# Patient Record
Sex: MALE | Race: WHITE | Employment: OTHER | URBAN - METROPOLITAN AREA
[De-identification: names, ages, dates, MRNs, and addresses within clinical notes are randomized per-mention and may not be internally consistent; named-entity substitution may affect disease eponyms.]

---

## 2022-03-13 ENCOUNTER — APPOINTMENT (OUTPATIENT)
Dept: CT IMAGING | Age: 82
DRG: 287 | End: 2022-03-13
Payer: MEDICARE

## 2022-03-13 ENCOUNTER — HOSPITAL ENCOUNTER (INPATIENT)
Age: 82
LOS: 3 days | Discharge: HOME | DRG: 287 | End: 2022-03-16
Attending: INTERNAL MEDICINE | Admitting: INTERNAL MEDICINE
Payer: MEDICARE

## 2022-03-13 ENCOUNTER — APPOINTMENT (OUTPATIENT)
Dept: GENERAL RADIOLOGY | Age: 82
DRG: 287 | End: 2022-03-13
Payer: MEDICARE

## 2022-03-13 DIAGNOSIS — R55 NEAR SYNCOPE: Primary | ICD-10-CM

## 2022-03-13 DIAGNOSIS — R06.09 DOE (DYSPNEA ON EXERTION): ICD-10-CM

## 2022-03-13 PROBLEM — I10 HTN (HYPERTENSION): Status: ACTIVE | Noted: 2022-03-13

## 2022-03-13 PROBLEM — F32.A DEPRESSION: Status: ACTIVE | Noted: 2022-03-13

## 2022-03-13 PROBLEM — E78.00 HIGH CHOLESTEROL: Status: ACTIVE | Noted: 2022-03-13

## 2022-03-13 LAB
A/G RATIO: 2 (ref 1.1–2.2)
ALBUMIN SERPL-MCNC: 4.5 G/DL (ref 3.4–5)
ALP BLD-CCNC: 41 U/L (ref 40–129)
ALT SERPL-CCNC: 17 U/L (ref 10–40)
ANION GAP SERPL CALCULATED.3IONS-SCNC: 12 MMOL/L (ref 3–16)
AST SERPL-CCNC: 20 U/L (ref 15–37)
BASOPHILS ABSOLUTE: 0.1 K/UL (ref 0–0.2)
BASOPHILS RELATIVE PERCENT: 0.7 %
BILIRUB SERPL-MCNC: 1.2 MG/DL (ref 0–1)
BILIRUBIN URINE: NEGATIVE
BLOOD, URINE: ABNORMAL
BUN BLDV-MCNC: 26 MG/DL (ref 7–20)
CALCIUM SERPL-MCNC: 9.8 MG/DL (ref 8.3–10.6)
CHLORIDE BLD-SCNC: 102 MMOL/L (ref 99–110)
CLARITY: CLEAR
CO2: 24 MMOL/L (ref 21–32)
COLOR: YELLOW
CREAT SERPL-MCNC: 1.3 MG/DL (ref 0.8–1.3)
EOSINOPHILS ABSOLUTE: 0 K/UL (ref 0–0.6)
EOSINOPHILS RELATIVE PERCENT: 0.4 %
EPITHELIAL CELLS, UA: 3 /HPF (ref 0–5)
GFR AFRICAN AMERICAN: >60
GFR NON-AFRICAN AMERICAN: 53
GLUCOSE BLD-MCNC: 109 MG/DL (ref 70–99)
GLUCOSE URINE: NEGATIVE MG/DL
HCT VFR BLD CALC: 42.3 % (ref 40.5–52.5)
HEMOGLOBIN: 14.2 G/DL (ref 13.5–17.5)
HYALINE CASTS: ABNORMAL /LPF (ref 0–2)
KETONES, URINE: ABNORMAL MG/DL
LEUKOCYTE ESTERASE, URINE: ABNORMAL
LYMPHOCYTES ABSOLUTE: 1.6 K/UL (ref 1–5.1)
LYMPHOCYTES RELATIVE PERCENT: 18.3 %
MAGNESIUM: 1.9 MG/DL (ref 1.8–2.4)
MCH RBC QN AUTO: 30.7 PG (ref 26–34)
MCHC RBC AUTO-ENTMCNC: 33.6 G/DL (ref 31–36)
MCV RBC AUTO: 91.6 FL (ref 80–100)
MICROSCOPIC EXAMINATION: YES
MONOCYTES ABSOLUTE: 0.7 K/UL (ref 0–1.3)
MONOCYTES RELATIVE PERCENT: 8.2 %
NEUTROPHILS ABSOLUTE: 6.3 K/UL (ref 1.7–7.7)
NEUTROPHILS RELATIVE PERCENT: 72.4 %
NITRITE, URINE: NEGATIVE
PDW BLD-RTO: 12.9 % (ref 12.4–15.4)
PH UA: 5 (ref 5–8)
PLATELET # BLD: 196 K/UL (ref 135–450)
PMV BLD AUTO: 7.6 FL (ref 5–10.5)
POTASSIUM SERPL-SCNC: 4.9 MMOL/L (ref 3.5–5.1)
PRO-BNP: 2053 PG/ML (ref 0–449)
PROTEIN UA: ABNORMAL MG/DL
RBC # BLD: 4.61 M/UL (ref 4.2–5.9)
RBC UA: 3 /HPF (ref 0–4)
SODIUM BLD-SCNC: 138 MMOL/L (ref 136–145)
SPECIFIC GRAVITY UA: 1.02 (ref 1–1.03)
TOTAL PROTEIN: 6.8 G/DL (ref 6.4–8.2)
TROPONIN: <0.01 NG/ML
URINE REFLEX TO CULTURE: YES
URINE TYPE: ABNORMAL
UROBILINOGEN, URINE: 0.2 E.U./DL
WBC # BLD: 8.7 K/UL (ref 4–11)
WBC UA: 20 /HPF (ref 0–5)

## 2022-03-13 PROCEDURE — 71045 X-RAY EXAM CHEST 1 VIEW: CPT

## 2022-03-13 PROCEDURE — 70450 CT HEAD/BRAIN W/O DYE: CPT

## 2022-03-13 PROCEDURE — 87086 URINE CULTURE/COLONY COUNT: CPT

## 2022-03-13 PROCEDURE — 83880 ASSAY OF NATRIURETIC PEPTIDE: CPT

## 2022-03-13 PROCEDURE — 84484 ASSAY OF TROPONIN QUANT: CPT

## 2022-03-13 PROCEDURE — 80053 COMPREHEN METABOLIC PANEL: CPT

## 2022-03-13 PROCEDURE — 87077 CULTURE AEROBIC IDENTIFY: CPT

## 2022-03-13 PROCEDURE — 83735 ASSAY OF MAGNESIUM: CPT

## 2022-03-13 PROCEDURE — 2060000000 HC ICU INTERMEDIATE R&B

## 2022-03-13 PROCEDURE — 93005 ELECTROCARDIOGRAM TRACING: CPT | Performed by: INTERNAL MEDICINE

## 2022-03-13 PROCEDURE — 85025 COMPLETE CBC W/AUTO DIFF WBC: CPT

## 2022-03-13 PROCEDURE — 81001 URINALYSIS AUTO W/SCOPE: CPT

## 2022-03-13 PROCEDURE — 36415 COLL VENOUS BLD VENIPUNCTURE: CPT

## 2022-03-13 PROCEDURE — 87186 SC STD MICRODIL/AGAR DIL: CPT

## 2022-03-13 PROCEDURE — 2580000003 HC RX 258: Performed by: INTERNAL MEDICINE

## 2022-03-13 PROCEDURE — 99283 EMERGENCY DEPT VISIT LOW MDM: CPT

## 2022-03-13 RX ORDER — AMLODIPINE BESYLATE 10 MG/1
10 TABLET ORAL DAILY
COMMUNITY

## 2022-03-13 RX ORDER — LORATADINE 10 MG/1
10 TABLET ORAL NIGHTLY
COMMUNITY

## 2022-03-13 RX ORDER — ATORVASTATIN CALCIUM 10 MG/1
10 TABLET, FILM COATED ORAL DAILY
Status: DISCONTINUED | OUTPATIENT
Start: 2022-03-14 | End: 2022-03-16 | Stop reason: HOSPADM

## 2022-03-13 RX ORDER — ACETAMINOPHEN 650 MG/1
650 SUPPOSITORY RECTAL EVERY 6 HOURS PRN
Status: DISCONTINUED | OUTPATIENT
Start: 2022-03-13 | End: 2022-03-16 | Stop reason: HOSPADM

## 2022-03-13 RX ORDER — SODIUM CHLORIDE 9 MG/ML
INJECTION, SOLUTION INTRAVENOUS CONTINUOUS
Status: DISCONTINUED | OUTPATIENT
Start: 2022-03-13 | End: 2022-03-14

## 2022-03-13 RX ORDER — ONDANSETRON 4 MG/1
4 TABLET, ORALLY DISINTEGRATING ORAL EVERY 8 HOURS PRN
Status: DISCONTINUED | OUTPATIENT
Start: 2022-03-13 | End: 2022-03-16 | Stop reason: HOSPADM

## 2022-03-13 RX ORDER — ONDANSETRON 2 MG/ML
4 INJECTION INTRAMUSCULAR; INTRAVENOUS EVERY 6 HOURS PRN
Status: DISCONTINUED | OUTPATIENT
Start: 2022-03-13 | End: 2022-03-16 | Stop reason: HOSPADM

## 2022-03-13 RX ORDER — POTASSIUM CHLORIDE 7.45 MG/ML
10 INJECTION INTRAVENOUS PRN
Status: DISCONTINUED | OUTPATIENT
Start: 2022-03-13 | End: 2022-03-13 | Stop reason: SDUPTHER

## 2022-03-13 RX ORDER — POTASSIUM CHLORIDE 20 MEQ/1
40 TABLET, EXTENDED RELEASE ORAL PRN
Status: DISCONTINUED | OUTPATIENT
Start: 2022-03-13 | End: 2022-03-13 | Stop reason: SDUPTHER

## 2022-03-13 RX ORDER — SODIUM CHLORIDE 9 MG/ML
25 INJECTION, SOLUTION INTRAVENOUS PRN
Status: DISCONTINUED | OUTPATIENT
Start: 2022-03-13 | End: 2022-03-16 | Stop reason: HOSPADM

## 2022-03-13 RX ORDER — AMLODIPINE BESYLATE 5 MG/1
10 TABLET ORAL DAILY
Status: DISCONTINUED | OUTPATIENT
Start: 2022-03-14 | End: 2022-03-16 | Stop reason: HOSPADM

## 2022-03-13 RX ORDER — ATORVASTATIN CALCIUM 10 MG/1
5 TABLET, FILM COATED ORAL DAILY
COMMUNITY

## 2022-03-13 RX ORDER — SODIUM CHLORIDE 0.9 % (FLUSH) 0.9 %
5-40 SYRINGE (ML) INJECTION EVERY 12 HOURS SCHEDULED
Status: DISCONTINUED | OUTPATIENT
Start: 2022-03-13 | End: 2022-03-16 | Stop reason: HOSPADM

## 2022-03-13 RX ORDER — SODIUM CHLORIDE 0.9 % (FLUSH) 0.9 %
10 SYRINGE (ML) INJECTION PRN
Status: DISCONTINUED | OUTPATIENT
Start: 2022-03-13 | End: 2022-03-16 | Stop reason: HOSPADM

## 2022-03-13 RX ORDER — POTASSIUM CHLORIDE 7.45 MG/ML
10 INJECTION INTRAVENOUS PRN
Status: DISCONTINUED | OUTPATIENT
Start: 2022-03-13 | End: 2022-03-16 | Stop reason: HOSPADM

## 2022-03-13 RX ORDER — ACETAMINOPHEN/DIPHENHYDRAMINE 500MG-25MG
1 TABLET ORAL NIGHTLY PRN
COMMUNITY

## 2022-03-13 RX ORDER — M-VIT,TX,IRON,MINS/CALC/FOLIC 27MG-0.4MG
1 TABLET ORAL DAILY
COMMUNITY

## 2022-03-13 RX ORDER — ACETAMINOPHEN 325 MG/1
650 TABLET ORAL EVERY 6 HOURS PRN
Status: DISCONTINUED | OUTPATIENT
Start: 2022-03-13 | End: 2022-03-16 | Stop reason: HOSPADM

## 2022-03-13 RX ORDER — 0.9 % SODIUM CHLORIDE 0.9 %
500 INTRAVENOUS SOLUTION INTRAVENOUS PRN
Status: DISCONTINUED | OUTPATIENT
Start: 2022-03-13 | End: 2022-03-16 | Stop reason: HOSPADM

## 2022-03-13 RX ORDER — POTASSIUM CHLORIDE 20 MEQ/1
40 TABLET, EXTENDED RELEASE ORAL PRN
Status: DISCONTINUED | OUTPATIENT
Start: 2022-03-13 | End: 2022-03-16 | Stop reason: HOSPADM

## 2022-03-13 RX ORDER — HYDRALAZINE HYDROCHLORIDE 20 MG/ML
10 INJECTION INTRAMUSCULAR; INTRAVENOUS EVERY 6 HOURS PRN
Status: DISCONTINUED | OUTPATIENT
Start: 2022-03-13 | End: 2022-03-16 | Stop reason: HOSPADM

## 2022-03-13 RX ADMIN — SODIUM CHLORIDE: 9 INJECTION, SOLUTION INTRAVENOUS at 22:40

## 2022-03-13 RX ADMIN — Medication 10 ML: at 22:43

## 2022-03-13 ASSESSMENT — ENCOUNTER SYMPTOMS
CONSTIPATION: 0
COLOR CHANGE: 0
STRIDOR: 0
SHORTNESS OF BREATH: 1
DIARRHEA: 0
BACK PAIN: 0
NAUSEA: 0
COUGH: 0
VOMITING: 0
WHEEZING: 0
ABDOMINAL PAIN: 0

## 2022-03-13 ASSESSMENT — PAIN SCALES - GENERAL: PAINLEVEL_OUTOF10: 0

## 2022-03-13 NOTE — H&P
for polydipsia and polyphagia. Genitourinary: Negative for frequency, hematuria and urgency. Musculoskeletal: Negative for back pain and myalgias. Skin: Negative for rash. Allergic/Immunologic: Negative for food allergies. Neurological: Negative for seizures, syncope and facial asymmetry. Positive for dizziness, headache  Hematological: Negative for adenopathy. Psychiatric/Behavioral: Negative for dysphoric mood. The patient is not nervous/anxious. Past Medical History:   Past Medical History:   Diagnosis Date    Anxiety     Hyperlipidemia     Hypertension        Past Surgical History: History reviewed. No pertinent surgical history. Social History:   Social History     Tobacco History     Smoking Status  Never Assessed    Smokeless Tobacco Use  Unknown          Alcohol History     Alcohol Use Status  Not Asked          Drug Use     Drug Use Status  Not Asked          Sexual Activity     Sexually Active  Not Asked                Fam History: History reviewed. No pertinent family history. PFSH: The above PMHx, PSHx, SocHx, FamHx has been reviewed by myself. (1 area for detailed, 2-3 for comprehensive)      Code Status: No Order    Meds  following list of home medications fromelectronic chart has been reviewed by myself  Prior to Admission medications    Medication Sig Start Date End Date Taking?  Authorizing Provider   sertraline (ZOLOFT) 50 MG tablet Take 50 mg by mouth daily   Yes Historical Provider, MD   amLODIPine (NORVASC) 10 MG tablet Take 10 mg by mouth daily   Yes Historical Provider, MD   atorvastatin (LIPITOR) 10 MG tablet Take 10 mg by mouth daily   Yes Historical Provider, MD         No Known Allergies          EXAM: (2-7 system for EPF/Detailed, ?8 for Comprehensive)  /71   Pulse 57   Temp 98 °F (36.7 °C)   Resp 14   Ht 5' 8\" (1.727 m)   Wt 165 lb (74.8 kg)   SpO2 99%   BMI 25.09 kg/m²   Constitutional: vitals as above: alert, appears stated age and cooperative    Psychiatric: normal insight and judgment, oriented to person, place, time, and general circumstances    Head: Normocephalic, without obvious abnormality, atraumatic    Eyes:lids and lashes normal, conjunctivae and sclerae normal and pupils equal, round, reactive to light and accomodation    EMNT: external ears normal, nares midline    Neck: no carotid bruit, supple, symmetrical, trachea midline and thyroid not enlarged, symmetric, no tenderness/mass/nodules     Respiratory: clear to auscultation and percussion bilaterally with normal respiratory effort    Cardiovascular: normal rate, regular rhythm, normal S1 and S2 and no murmurs    Gastrointestinal: soft, non-tender, non-distended, normal bowel sounds, no masses or organomegaly    Extremities: no clubbing, no edema    Skin:No rashes or nodules noted. Neurologic:negative         LABS:  Labs Reviewed   COMPREHENSIVE METABOLIC PANEL - Abnormal; Notable for the following components:       Result Value    Glucose 109 (*)     BUN 26 (*)     GFR Non- 53 (*)     Total Bilirubin 1.2 (*)     All other components within normal limits   BRAIN NATRIURETIC PEPTIDE - Abnormal; Notable for the following components:    Pro-BNP 2,053 (*)     All other components within normal limits   CBC WITH AUTO DIFFERENTIAL   TROPONIN   MAGNESIUM   URINALYSIS WITH REFLEX TO CULTURE         IMAGING:  Imaging results from the ER have been reviewed in the computerized chart. CT HEAD WO CONTRAST    Result Date: 3/13/2022  EXAMINATION: CT OF THE HEAD WITHOUT CONTRAST  3/13/2022 4:24 pm TECHNIQUE: CT of the head was performed without the administration of intravenous contrast. Dose modulation, iterative reconstruction, and/or weight based adjustment of the mA/kV was utilized to reduce the radiation dose to as low as reasonably achievable. COMPARISON: None.  HISTORY: ORDERING SYSTEM PROVIDED HISTORY: dizziness TECHNOLOGIST PROVIDED HISTORY: Reason for exam:->dizziness Has a \"code stroke\" or \"stroke alert\" been called? ->No Decision Support Exception - unselect if not a suspected or confirmed emergency medical condition->Emergency Medical Condition (MA) Reason for Exam: dizziness Relevant Medical/Surgical History: Dizziness (c/o dizzyness on and off for months) FINDINGS: BRAIN/VENTRICLES: There is no acute intracranial hemorrhage, mass effect or midline shift. No abnormal extra-axial fluid collection. The gray-white differentiation is maintained without evidence of an acute infarct. There is no evidence of hydrocephalus. There are chronic atrophic changes consistent with the patient's age. ORBITS: The visualized portion of the orbits demonstrate no acute abnormality. SINUSES: The visualized paranasal sinuses and mastoid air cells demonstrate no acute abnormality. SOFT TISSUES/SKULL:  No acute abnormality of the visualized skull or soft tissues. No acute intracranial abnormality. XR CHEST PORTABLE    Result Date: 3/13/2022  EXAMINATION: ONE XRAY VIEW OF THE CHEST 3/13/2022 2:43 pm COMPARISON: None. HISTORY: ORDERING SYSTEM PROVIDED HISTORY: dizzy TECHNOLOGIST PROVIDED HISTORY: Reason for exam:->dizzy Reason for Exam: Dizziness (c/o dizzyness on and off for months) FINDINGS: No tracheal or mediastinal shift was noted. No cardiomegaly, pneumonia, interstitial edema or pleural effusions were identified. No hilar mass was noted. The regional skeleton was unremarkable. No cardiomegaly, pneumonia or interstitial edema. EKG:   EKG from ER, reviewed by self  it shows normal sinus at 63.  No acute st changes noted  Old chart reviewed, no old EKG available    Lab Results   Component Value Date    GLUCOSE 109 03/13/2022     No results found for: POCGLU  /71   Pulse 57   Temp 98 °F (36.7 °C)   Resp 14   Ht 5' 8\" (1.727 m)   Wt 165 lb (74.8 kg)   SpO2 99%   BMI 25.09 kg/m²     MEDICAL DECISION MAKING:    Principal Problem:    Syncope and

## 2022-03-13 NOTE — ED PROVIDER NOTES
EKG NOTE:    Sinus rhythm at a rate of 63 beats a minute with no acute ST elevations or depressions or pathologic Q waves. I was not involved with the care of this patient.        Vasiliy Leo MD  03/13/22 4779

## 2022-03-13 NOTE — ED PROVIDER NOTES
[facility]        Pt Name: Lukas Humphries  MRN: [id number]  [place] 1940  Date of evaluation: 3/13/2022  Provider: Nia Oliver PA-C  PCP: Amandeep Kapoor  Note Started: 4:03 PM EDT       DAVIS. I have evaluated this patient. My supervising physician was available for consultation. CHIEF COMPLAINT       Chief Complaint   Patient presents with    Dizziness     c/o dizzyness on and off for months       HISTORY OF PRESENT ILLNESS   (Location, Timing/Onset, Context/Setting, Quality, Duration, Modifying Factors, Severity, Associated Signs and Symptoms)  Note limiting factors. Chief Complaint: Orthostatic lightheadedness and exertional shortness of breath    Lukas Humphries is a 78 y.o. male who presents to the emergency department complaining of dizziness on and off for several months. He specifies it is not the room that is spinning, rather that he feels dizzy/lightheaded when he stands up quickly from a seated position. He also reports that even just short distances to the mailbox makes him feel very short of breath. He reports a mild occipital headache with radiation into the neck. This is been going on intermittently for months as well. He denies any preceding injury. Denies visual disturbances, back pain, neck stiffness, chest pain, cough, fever, chills, palpitations, hemoptysis, abdominal pain. Does report some increased swelling in the bilateral lower extremities over the past few weeks. Nursing Notes were all reviewed and agreed with or any disagreements were addressed in the HPI. REVIEW OF SYSTEMS    (2-9 systems for level 4, 10 or more for level 5)     Review of Systems   Constitutional: Positive for fatigue. Negative for chills and fever. HENT: Negative. Eyes: Negative for visual disturbance. Respiratory: Positive for shortness of breath. Negative for cough, wheezing and stridor. Cardiovascular: Positive for leg swelling. Negative for chest pain and palpitations. Gastrointestinal: Negative for abdominal pain, constipation, diarrhea, nausea and vomiting. Genitourinary: Negative. Musculoskeletal: Negative for back pain, neck pain and neck stiffness. Skin: Negative for color change, pallor, rash and wound. Neurological: Negative for dizziness, tremors, seizures, syncope, facial asymmetry, speech difficulty, weakness, light-headedness, numbness and headaches. Psychiatric/Behavioral: Negative for confusion. All other systems reviewed and are negative. Positives and Pertinent negatives as per HPI. Except as noted above in the ROS, all other systems were reviewed and negative. PAST MEDICAL HISTORY     Past Medical History:   Diagnosis Date    Anxiety     Hyperlipidemia     Hypertension          SURGICAL HISTORY   History reviewed. No pertinent surgical history. CURRENTMEDICATIONS       Previous Medications    No medications on file         ALLERGIES     Patient has no known allergies. FAMILYHISTORY     History reviewed. No pertinent family history. SOCIAL HISTORY       Social History     Tobacco Use    Smoking status: Not on file    Smokeless tobacco: Not on file   Substance Use Topics    Alcohol use: Not on file    Drug use: Not on file       SCREENINGS   NIH Stroke Scale  Interval: Baseline  Level of Consciousness (1a. ): Alert  LOC Questions (1b. ):  Answers both correctly  LOC Commands (1c. ): Performs both tasks correctly  Best Gaze (2. ): Normal  Visual (3. ): No visual loss  Facial Palsy (4. ): Normal symmetrical movement  Motor Arm, Left (5a. ): No drift  Motor Arm, Right (5b. ): No drift  Motor Leg, Left (6a. ): No drift  Motor Leg, Right (6b. ): No drift  Limb Ataxia (7. ): Absent  Sensory (8. ): Normal  Best Language (9. ): No aphasia  Dysarthria (10. ): Normal  Extinction and Inattention (11): No abnormality  Total: 0Glasgow Coma Scale  Eye Opening: Spontaneous  Best Verbal Response: Oriented  Best Motor Response: Obeys commands  Michael Coma Scale Score: 15        PHYSICAL EXAM    (up to 7 for level 4, 8 or more for level 5)     ED Triage Vitals [03/13/22 1441]   BP Temp Temp src Pulse Resp SpO2 Height Weight   129/77 98 °F (36.7 °C) -- 74 20 100 % 5' 8\" (1.727 m) 165 lb (74.8 kg)       Physical Exam  Vitals and nursing note reviewed. Constitutional:       Appearance: Normal appearance. He is well-developed. He is not toxic-appearing or diaphoretic. HENT:      Head: Normocephalic and atraumatic. Right Ear: External ear normal.      Left Ear: External ear normal.      Nose: Nose normal.      Mouth/Throat:      Mouth: Mucous membranes are moist.      Pharynx: Oropharynx is clear. Eyes:      General: No scleral icterus. Right eye: No discharge. Left eye: No discharge. Extraocular Movements: Extraocular movements intact. Conjunctiva/sclera: Conjunctivae normal.      Pupils: Pupils are equal, round, and reactive to light. Cardiovascular:      Rate and Rhythm: Normal rate. Pulmonary:      Effort: Pulmonary effort is normal.      Breath sounds: Normal breath sounds. Abdominal:      General: Bowel sounds are normal.      Palpations: Abdomen is soft. Tenderness: There is no abdominal tenderness. There is no right CVA tenderness or left CVA tenderness. Musculoskeletal:         General: Normal range of motion. Cervical back: Normal range of motion. Comments: Trace symmetrical pretibial edema. No posterior calf or thigh tenderness, palpable cord, discoloration. Negative homans. Skin:     General: Skin is warm and dry. Capillary Refill: Capillary refill takes less than 2 seconds. Coloration: Skin is not jaundiced or pale. Findings: No bruising, erythema, lesion or rash. Neurological:      General: No focal deficit present.       Mental Status: He is alert and oriented to person, place, and time. Cranial Nerves: No cranial nerve deficit (II-XII intact). Sensory: No sensory deficit. Motor: No weakness. Deep Tendon Reflexes: Reflexes normal.      Comments: No pronator drift, facial droop or slurred speech. Normal finger to nose coordination. Normal rapid alternating hand movement. Normal heel to shin coordination  Misti Hallpike negative without nystagmus. 5 out of 5 strength in all 4 extremities without focal weakness, paresthesia or radiculopathy. Psychiatric:         Mood and Affect: Mood normal.         Behavior: Behavior normal.         DIAGNOSTIC RESULTS   LABS:    Labs Reviewed   COMPREHENSIVE METABOLIC PANEL - Abnormal; Notable for the following components:       Result Value    Glucose 109 (*)     BUN 26 (*)     GFR Non- 53 (*)     Total Bilirubin 1.2 (*)     All other components within normal limits   BRAIN NATRIURETIC PEPTIDE - Abnormal; Notable for the following components:    Pro-BNP 2,053 (*)     All other components within normal limits   CBC WITH AUTO DIFFERENTIAL   TROPONIN   MAGNESIUM   URINALYSIS WITH REFLEX TO CULTURE       When ordered only abnormal lab results are displayed. All other labs were within normal range or not returned as of this dictation. EKG: When ordered, EKG's are interpreted by the Emergency Department Physician in the absence of a cardiologist.  Please see their note for interpretation of EKG. RADIOLOGY:   Non-plain film images such as CT, Ultrasound and MRI are read by the radiologist. Plain radiographic images are visualized and preliminarily interpreted by the ED Provider with the below findings:        Interpretation per the Radiologist below, if available at the time of this note:    CT HEAD WO CONTRAST   Final Result   No acute intracranial abnormality. XR CHEST PORTABLE   Final Result   No cardiomegaly, pneumonia or interstitial edema.                    PROCEDURES   Unless otherwise noted below, none     Procedures    CRITICAL CARE TIME   n/a    CONSULTS:  Arti Mccain will admit    EMERGENCY DEPARTMENT COURSE and DIFFERENTIAL DIAGNOSIS/MDM:   Vitals:    Vitals:    03/13/22 1604 03/13/22 1633 03/13/22 1645 03/13/22 1704   BP:  130/74 117/73 121/68   Pulse:   60 57   Resp: 14 16 14 20   Temp:       SpO2: 98%  98% 100%   Weight:       Height:           Patient was given the following medications:  Medications - No data to display        This patient presents to the emergency department complaining of dizziness/lightheadedness and dyspnea on exertion for several weeks. It is progressively worsened. Although he reports bilateral peripheral edema in lower extremities, chest x-ray shows no cardiomegaly, pneumonia or interstitial edema. However BNP is elevated. CT head shows no acute intracranial abnormality. NIH scale 0. Other blood work appears stable at this time. I do feel admission is warranted for further evaluation of his potential near syncope/MATTSON. FINAL IMPRESSION      1. Near syncope    2. MATTSON (dyspnea on exertion)          DISPOSITION/PLAN   DISPOSITION        PATIENT REFERRED TO:  No follow-up provider specified.     DISCHARGE MEDICATIONS:  New Prescriptions    No medications on file       DISCONTINUED MEDICATIONS:  Discontinued Medications    No medications on file              (Please note that portions of this note were completed with a voice recognition program.  Efforts were made to edit the dictations but occasionally words are mis-transcribed.)    Radha Levine PA-C (electronically signed)           Radha Levine PA-C  03/13/22 4375

## 2022-03-14 ENCOUNTER — APPOINTMENT (OUTPATIENT)
Dept: CT IMAGING | Age: 82
DRG: 287 | End: 2022-03-14
Payer: MEDICARE

## 2022-03-14 PROBLEM — I48.91 ATRIAL FIBRILLATION (HCC): Status: ACTIVE | Noted: 2022-03-14

## 2022-03-14 PROBLEM — R60.0 LOWER EXTREMITY EDEMA: Status: ACTIVE | Noted: 2022-03-14

## 2022-03-14 LAB
A/G RATIO: 2.1 (ref 1.1–2.2)
ALBUMIN SERPL-MCNC: 4 G/DL (ref 3.4–5)
ALP BLD-CCNC: 34 U/L (ref 40–129)
ALT SERPL-CCNC: 14 U/L (ref 10–40)
ANION GAP SERPL CALCULATED.3IONS-SCNC: 12 MMOL/L (ref 3–16)
APTT: 172.5 SEC (ref 26.2–38.6)
APTT: 34.7 SEC (ref 26.2–38.6)
AST SERPL-CCNC: 17 U/L (ref 15–37)
BASOPHILS ABSOLUTE: 0 K/UL (ref 0–0.2)
BASOPHILS RELATIVE PERCENT: 0.7 %
BILIRUB SERPL-MCNC: 0.9 MG/DL (ref 0–1)
BUN BLDV-MCNC: 29 MG/DL (ref 7–20)
CALCIUM SERPL-MCNC: 9.1 MG/DL (ref 8.3–10.6)
CHLORIDE BLD-SCNC: 107 MMOL/L (ref 99–110)
CO2: 23 MMOL/L (ref 21–32)
CREAT SERPL-MCNC: 0.9 MG/DL (ref 0.8–1.3)
D DIMER: 1417 NG/ML DDU (ref 0–229)
EKG ATRIAL RATE: 102 BPM
EKG ATRIAL RATE: 63 BPM
EKG DIAGNOSIS: NORMAL
EKG DIAGNOSIS: NORMAL
EKG P AXIS: 24 DEGREES
EKG P-R INTERVAL: 186 MS
EKG Q-T INTERVAL: 364 MS
EKG Q-T INTERVAL: 402 MS
EKG QRS DURATION: 102 MS
EKG QRS DURATION: 94 MS
EKG QTC CALCULATION (BAZETT): 411 MS
EKG QTC CALCULATION (BAZETT): 447 MS
EKG R AXIS: -47 DEGREES
EKG R AXIS: -51 DEGREES
EKG T AXIS: 53 DEGREES
EKG T AXIS: 58 DEGREES
EKG VENTRICULAR RATE: 63 BPM
EKG VENTRICULAR RATE: 91 BPM
EOSINOPHILS ABSOLUTE: 0.1 K/UL (ref 0–0.6)
EOSINOPHILS RELATIVE PERCENT: 1.3 %
GFR AFRICAN AMERICAN: >60
GFR NON-AFRICAN AMERICAN: >60
GLUCOSE BLD-MCNC: 95 MG/DL (ref 70–99)
HCT VFR BLD CALC: 38.5 % (ref 40.5–52.5)
HCT VFR BLD CALC: 41.7 % (ref 40.5–52.5)
HEMOGLOBIN: 12.9 G/DL (ref 13.5–17.5)
HEMOGLOBIN: 14 G/DL (ref 13.5–17.5)
LV EF: 53 %
LVEF MODALITY: NORMAL
LYMPHOCYTES ABSOLUTE: 1.8 K/UL (ref 1–5.1)
LYMPHOCYTES RELATIVE PERCENT: 32.6 %
MCH RBC QN AUTO: 30.7 PG (ref 26–34)
MCH RBC QN AUTO: 30.8 PG (ref 26–34)
MCHC RBC AUTO-ENTMCNC: 33.6 G/DL (ref 31–36)
MCHC RBC AUTO-ENTMCNC: 33.7 G/DL (ref 31–36)
MCV RBC AUTO: 91.4 FL (ref 80–100)
MCV RBC AUTO: 91.7 FL (ref 80–100)
MONOCYTES ABSOLUTE: 0.6 K/UL (ref 0–1.3)
MONOCYTES RELATIVE PERCENT: 10.9 %
NEUTROPHILS ABSOLUTE: 3 K/UL (ref 1.7–7.7)
NEUTROPHILS RELATIVE PERCENT: 54.5 %
PDW BLD-RTO: 12.8 % (ref 12.4–15.4)
PDW BLD-RTO: 13.2 % (ref 12.4–15.4)
PLATELET # BLD: 171 K/UL (ref 135–450)
PLATELET # BLD: 172 K/UL (ref 135–450)
PMV BLD AUTO: 7.8 FL (ref 5–10.5)
PMV BLD AUTO: 8.1 FL (ref 5–10.5)
POTASSIUM REFLEX MAGNESIUM: 4.3 MMOL/L (ref 3.5–5.1)
RBC # BLD: 4.2 M/UL (ref 4.2–5.9)
RBC # BLD: 4.56 M/UL (ref 4.2–5.9)
SODIUM BLD-SCNC: 142 MMOL/L (ref 136–145)
TOTAL PROTEIN: 5.9 G/DL (ref 6.4–8.2)
TROPONIN: <0.01 NG/ML
TSH REFLEX: 2.97 UIU/ML (ref 0.27–4.2)
WBC # BLD: 5.5 K/UL (ref 4–11)
WBC # BLD: 6 K/UL (ref 4–11)

## 2022-03-14 PROCEDURE — 93970 EXTREMITY STUDY: CPT

## 2022-03-14 PROCEDURE — 84443 ASSAY THYROID STIM HORMONE: CPT

## 2022-03-14 PROCEDURE — 2580000003 HC RX 258: Performed by: INTERNAL MEDICINE

## 2022-03-14 PROCEDURE — 36415 COLL VENOUS BLD VENIPUNCTURE: CPT

## 2022-03-14 PROCEDURE — 93005 ELECTROCARDIOGRAM TRACING: CPT | Performed by: INTERNAL MEDICINE

## 2022-03-14 PROCEDURE — 3430000000 HC RX DIAGNOSTIC RADIOPHARMACEUTICAL: Performed by: INTERNAL MEDICINE

## 2022-03-14 PROCEDURE — 93010 ELECTROCARDIOGRAM REPORT: CPT | Performed by: INTERNAL MEDICINE

## 2022-03-14 PROCEDURE — 6370000000 HC RX 637 (ALT 250 FOR IP): Performed by: INTERNAL MEDICINE

## 2022-03-14 PROCEDURE — 85027 COMPLETE CBC AUTOMATED: CPT

## 2022-03-14 PROCEDURE — 6360000004 HC RX CONTRAST MEDICATION: Performed by: INTERNAL MEDICINE

## 2022-03-14 PROCEDURE — 94760 N-INVAS EAR/PLS OXIMETRY 1: CPT

## 2022-03-14 PROCEDURE — A9502 TC99M TETROFOSMIN: HCPCS | Performed by: INTERNAL MEDICINE

## 2022-03-14 PROCEDURE — 71260 CT THORAX DX C+: CPT

## 2022-03-14 PROCEDURE — 85730 THROMBOPLASTIN TIME PARTIAL: CPT

## 2022-03-14 PROCEDURE — 84484 ASSAY OF TROPONIN QUANT: CPT

## 2022-03-14 PROCEDURE — 85025 COMPLETE CBC W/AUTO DIFF WBC: CPT

## 2022-03-14 PROCEDURE — 85379 FIBRIN DEGRADATION QUANT: CPT

## 2022-03-14 PROCEDURE — 6360000002 HC RX W HCPCS: Performed by: INTERNAL MEDICINE

## 2022-03-14 PROCEDURE — 2060000000 HC ICU INTERMEDIATE R&B

## 2022-03-14 PROCEDURE — 80053 COMPREHEN METABOLIC PANEL: CPT

## 2022-03-14 PROCEDURE — 99223 1ST HOSP IP/OBS HIGH 75: CPT | Performed by: INTERNAL MEDICINE

## 2022-03-14 PROCEDURE — 93306 TTE W/DOPPLER COMPLETE: CPT

## 2022-03-14 RX ORDER — DIPHENHYDRAMINE HCL 25 MG
25 TABLET ORAL NIGHTLY PRN
Status: DISCONTINUED | OUTPATIENT
Start: 2022-03-14 | End: 2022-03-16 | Stop reason: HOSPADM

## 2022-03-14 RX ORDER — HEPARIN SODIUM 1000 [USP'U]/ML
80 INJECTION, SOLUTION INTRAVENOUS; SUBCUTANEOUS ONCE
Status: COMPLETED | OUTPATIENT
Start: 2022-03-14 | End: 2022-03-14

## 2022-03-14 RX ORDER — CETIRIZINE HYDROCHLORIDE 10 MG/1
10 TABLET ORAL NIGHTLY
Status: DISCONTINUED | OUTPATIENT
Start: 2022-03-14 | End: 2022-03-16 | Stop reason: HOSPADM

## 2022-03-14 RX ORDER — HEPARIN SODIUM 10000 [USP'U]/100ML
5-30 INJECTION, SOLUTION INTRAVENOUS CONTINUOUS
Status: DISCONTINUED | OUTPATIENT
Start: 2022-03-14 | End: 2022-03-15

## 2022-03-14 RX ORDER — HEPARIN SODIUM 1000 [USP'U]/ML
40 INJECTION, SOLUTION INTRAVENOUS; SUBCUTANEOUS PRN
Status: DISCONTINUED | OUTPATIENT
Start: 2022-03-14 | End: 2022-03-15 | Stop reason: ALTCHOICE

## 2022-03-14 RX ORDER — HEPARIN SODIUM 1000 [USP'U]/ML
80 INJECTION, SOLUTION INTRAVENOUS; SUBCUTANEOUS PRN
Status: DISCONTINUED | OUTPATIENT
Start: 2022-03-14 | End: 2022-03-15 | Stop reason: ALTCHOICE

## 2022-03-14 RX ADMIN — CETIRIZINE HYDROCHLORIDE 10 MG: 10 TABLET, FILM COATED ORAL at 22:11

## 2022-03-14 RX ADMIN — IOPAMIDOL 75 ML: 755 INJECTION, SOLUTION INTRAVENOUS at 14:53

## 2022-03-14 RX ADMIN — AMLODIPINE BESYLATE 10 MG: 5 TABLET ORAL at 08:42

## 2022-03-14 RX ADMIN — DIPHENHYDRAMINE HYDROCHLORIDE 25 MG: 25 TABLET ORAL at 22:12

## 2022-03-14 RX ADMIN — TETROFOSMIN 10 MILLICURIE: 1.38 INJECTION, POWDER, LYOPHILIZED, FOR SOLUTION INTRAVENOUS at 12:05

## 2022-03-14 RX ADMIN — Medication 18 UNITS/KG/HR: at 15:46

## 2022-03-14 RX ADMIN — SERTRALINE 50 MG: 50 TABLET, FILM COATED ORAL at 08:42

## 2022-03-14 RX ADMIN — Medication 10 ML: at 22:12

## 2022-03-14 RX ADMIN — ACETAMINOPHEN 650 MG: 325 TABLET ORAL at 22:11

## 2022-03-14 RX ADMIN — HEPARIN SODIUM 7070 UNITS: 1000 INJECTION INTRAVENOUS; SUBCUTANEOUS at 15:42

## 2022-03-14 RX ADMIN — ENOXAPARIN SODIUM 40 MG: 40 INJECTION SUBCUTANEOUS at 09:36

## 2022-03-14 RX ADMIN — ATORVASTATIN CALCIUM 10 MG: 10 TABLET, FILM COATED ORAL at 08:42

## 2022-03-14 ASSESSMENT — PAIN SCALES - GENERAL
PAINLEVEL_OUTOF10: 0

## 2022-03-14 NOTE — PROGRESS NOTES
Physical/Occupational Therapy  Nataly Hanson    Hold PT/OT evaluations at this time, patient currently off the floor for testing. Will follow up with patient as schedule allows.   Thanks,  Di Hayden, DPT [id number]  Nick Brito OTR/L  [id number]

## 2022-03-14 NOTE — PROGRESS NOTES
potassium bicarb-citric acid (EFFER-K) effervescent tablet 40 mEq, 40 mEq, Oral, PRN **OR** potassium chloride 10 mEq/100 mL IVPB (Peripheral Line), 10 mEq, IntraVENous, PRN  enoxaparin (LOVENOX) injection 40 mg, 40 mg, SubCUTAneous, Daily  ondansetron (ZOFRAN-ODT) disintegrating tablet 4 mg, 4 mg, Oral, Q8H PRN **OR** ondansetron (ZOFRAN) injection 4 mg, 4 mg, IntraVENous, Q6H PRN  magnesium hydroxide (MILK OF MAGNESIA) 400 MG/5ML suspension 30 mL, 30 mL, Oral, Daily PRN  acetaminophen (TYLENOL) tablet 650 mg, 650 mg, Oral, Q6H PRN **OR** acetaminophen (TYLENOL) suppository 650 mg, 650 mg, Rectal, Q6H PRN  hydrALAZINE (APRESOLINE) injection 10 mg, 10 mg, IntraVENous, Q6H PRN  0.9 % sodium chloride bolus, 500 mL, IntraVENous, PRN         Objective:  BP (!) 156/96   Pulse 79   Temp 97.5 °F (36.4 °C) (Temporal)   Resp 20   Ht 5' 10\" (1.778 m)   Wt 194 lb 14.4 oz (88.4 kg)   SpO2 96%   BMI 27.97 kg/m²      Patient Vitals for the past 24 hrs:   BP Temp Temp src Pulse Resp SpO2 Height Weight   03/14/22 0721 (!) 156/96 97.5 °F (36.4 °C) Temporal 79 20 96 %     03/14/22 0411 137/78 97.6 °F (36.4 °C) Oral 65 18 97 %     03/13/22 2039 (!) 152/86 96.4 °F (35.8 °C) Temporal 76 20 99 % 5' 10\" (1.778 m) 194 lb 14.4 oz (88.4 kg)   03/13/22 1915 (!) 142/86   61 18 100 %     03/13/22 1900 (!) 140/73   58 13 99 %     03/13/22 1800 133/71   57 14 99 %     03/13/22 1749 127/79   56 16 98 %     03/13/22 1729 129/70   62 12 98 %     03/13/22 1719 128/67   56 14 96 %     03/13/22 1704 121/68   57 20 100 %     03/13/22 1645 117/73   60 14 98 %     03/13/22 1633 130/74    16      03/13/22 1604     14 98 %     03/13/22 1441 129/77 98 °F (36.7 °C)  74 20 100 % 5' 8\" (1.727 m) 165 lb (74.8 kg)     Patient Vitals for the past 96 hrs (Last 3 readings):   Weight   03/13/22 2039 194 lb 14.4 oz (88.4 kg)   03/13/22 1441 165 lb (74.8 kg)           Intake/Output Summary (Last 24 hours) at 3/14/2022 0827  Last data filed at 3/14/2022 0756  Gross per 24 hour   Intake 10 ml   Output 375 ml   Net -365 ml         Physical Exam:   Vitals as above  General appearance: alert, appears stated age and cooperative    Head: Normocephalic, without obvious abnormality, atraumatic    Lungs: clear to auscultation bilaterally    Heart: regular rate and rhythm, S1, S2 normal, no murmur    Abdomen: soft, non-tender; bowel sounds normal; no masses, no organomegaly    Extremities: extremities normal, atraumatic, no cyanosis, no edema      Labs:  Lab Results   Component Value Date    WBC 5.5 03/14/2022    HGB 12.9 (L) 03/14/2022    HCT 38.5 (L) 03/14/2022     03/14/2022    ALT 14 03/14/2022    AST 17 03/14/2022     03/14/2022    K 4.3 03/14/2022     03/14/2022    CREATININE 0.9 03/14/2022    BUN 29 (H) 03/14/2022    CO2 23 03/14/2022    LABMICR YES 03/13/2022     Lab Results   Component Value Date    TROPONINI <0.01 03/13/2022       Recent Imaging Results are Reviewed:  CT HEAD WO CONTRAST    Result Date: 3/13/2022  EXAMINATION: CT OF THE HEAD WITHOUT CONTRAST  3/13/2022 4:24 pm TECHNIQUE: CT of the head was performed without the administration of intravenous contrast. Dose modulation, iterative reconstruction, and/or weight based adjustment of the mA/kV was utilized to reduce the radiation dose to as low as reasonably achievable. COMPARISON: None. HISTORY: ORDERING SYSTEM PROVIDED HISTORY: dizziness TECHNOLOGIST PROVIDED HISTORY: Reason for exam:->dizziness Has a \"code stroke\" or \"stroke alert\" been called? ->No Decision Support Exception - unselect if not a suspected or confirmed emergency medical condition->Emergency Medical Condition (MA) Reason for Exam: dizziness Relevant Medical/Surgical History: Dizziness (c/o dizzyness on and off for months) FINDINGS: BRAIN/VENTRICLES: There is no acute intracranial hemorrhage, mass effect or midline shift. No abnormal extra-axial fluid collection.   The gray-white differentiation is maintained without evidence of an acute infarct. There is no evidence of hydrocephalus. There are chronic atrophic changes consistent with the patient's age. ORBITS: The visualized portion of the orbits demonstrate no acute abnormality. SINUSES: The visualized paranasal sinuses and mastoid air cells demonstrate no acute abnormality. SOFT TISSUES/SKULL:  No acute abnormality of the visualized skull or soft tissues. No acute intracranial abnormality. XR CHEST PORTABLE    Result Date: 3/13/2022  EXAMINATION: ONE XRAY VIEW OF THE CHEST 3/13/2022 2:43 pm COMPARISON: None. HISTORY: ORDERING SYSTEM PROVIDED HISTORY: dizzy TECHNOLOGIST PROVIDED HISTORY: Reason for exam:->dizzy Reason for Exam: Dizziness (c/o dizzyness on and off for months) FINDINGS: No tracheal or mediastinal shift was noted. No cardiomegaly, pneumonia, interstitial edema or pleural effusions were identified. No hilar mass was noted. The regional skeleton was unremarkable. No cardiomegaly, pneumonia or interstitial edema. Lab Results   Component Value Date    GLUCOSE 95 03/14/2022     No results found for: POCGLU  BP (!) 156/96   Pulse 79   Temp 97.5 °F (36.4 °C) (Temporal)   Resp 20   Ht 5' 10\" (1.778 m)   Wt 194 lb 14.4 oz (88.4 kg)   SpO2 96%   BMI 27.97 kg/m²     Assessment and Plan:  Principal Problem:    Syncope and collapse -Established problem. Stable. No recurrence  Plan: ECHO and stress test ordered  Active Problems:    HTN (hypertension) -Established problem. Stable. 156/96  Plan: see if improves with AM meds. Iv hydralazine ordered prn    High cholesterol -Established problem. Stable. Plan: on statin, to continue    Depression  Plan: Continue present orders/plan.      LE edema  Plan - get LE dopplers, check DDimer    (Please note that portions of this note were completed with a voice recognition program.  Efforts were made to edit the dictations but occasionally words are mis-transcribed.)    Addendum - [id number]  Reports that pt has now flipped into afib  Will check EKG.   Cards already consulted    Livier Pineda MD  3/14/2022

## 2022-03-14 NOTE — PROGRESS NOTES
Pharmacy to check patient copays for Eliquis/Xarelto:    Copay for patient will be: $154.94 -- Eliquis & $151.32 -- Xarelto      Pharmacy will continue to follow the decision for anticoagulation and  the patient if appropriate.        Mainor Bradford, PharmD, [facility]

## 2022-03-14 NOTE — CONSULTS
High cholesterol [E78.00] 2022    Depression [F32. A] 2022       Diagnostic studies:   ECG: Sinus rhythm   CXR: Normal     I independently reviewed the cardiac diagnostic studies, ECG and relevant imaging studies. Physical Examination:  Vitals:    22 0721   BP: (!) 156/96   Pulse: 79   Resp: 20   Temp: 97.5 °F (36.4 °C)   SpO2: 96%      In: 10 [I.V.:10]  Out: 375    Wt Readings from Last 3 Encounters:   22 194 lb 14.4 oz (88.4 kg)     Temp  Av.4 °F (36.3 °C)  Min: 96.4 °F (35.8 °C)  Max: 98 °F (36.7 °C)  Pulse  Av.4  Min: 56  Max: 79  BP  Min: 117/73  Max: 156/96  SpO2  Av.3 %  Min: 96 %  Max: 100 %    Intake/Output Summary (Last 24 hours) at 3/14/2022 0839  Last data filed at 3/14/2022 0756  Gross per 24 hour   Intake 10 ml   Output 375 ml   Net -365 ml         I independently reviewed all cardiac tracing from cardiac telemetry. · Constitutional: Oriented. No distress. · Head: Normocephalic and atraumatic. · Mouth/Throat: Oropharynx is clear and moist.   · Eyes: Conjunctivae normal. EOM are normal.   · Neck: Neck supple. No JVD present. · Cardiovascular: Normal rate, Irregular rhythm, S1&S2. · Pulmonary/Chest: Bilateral respiratory sounds. No rhonchi. · Abdominal: Soft. No tenderness. · Musculoskeletal: No tenderness. Trace edema    · Lymphadenopathy: Has no cervical adenopathy. · Neurological: Alert and oriented. Follows command, Hard of hearing   · Skin: Skin is warm, No rash noted.    · Psychiatric: Has a normal behavior       Scheduled Meds:   sertraline  50 mg Oral Daily    amLODIPine  10 mg Oral Daily    atorvastatin  10 mg Oral Daily    sodium chloride flush  5-40 mL IntraVENous 2 times per day    enoxaparin  40 mg SubCUTAneous Daily     Continuous Infusions:   sodium chloride 75 mL/hr at 22 2240    sodium chloride       PRN Meds:.perflutren lipid microspheres, sodium chloride flush, sodium chloride, potassium chloride **OR** potassium alternative oral replacement **OR** potassium chloride, ondansetron **OR** ondansetron, magnesium hydroxide, acetaminophen **OR** acetaminophen, hydrALAZINE, sodium chloride     Review of System:  [x] Full ROS obtained and negative except as mentioned in HPI    Prior to Admission medications    Medication Sig Start Date End Date Taking? Authorizing Provider   sertraline (ZOLOFT) 50 MG tablet Take 50 mg by mouth daily   Yes Historical Provider, MD   amLODIPine (NORVASC) 10 MG tablet Take 10 mg by mouth daily   Yes Historical Provider, MD   atorvastatin (LIPITOR) 10 MG tablet Take 5 mg by mouth daily    Yes Historical Provider, MD   diphenhydrAMINE-APAP, sleep, (TYLENOL PM EXTRA STRENGTH)  MG tablet Take 1 tablet by mouth nightly as needed for Sleep   Yes Historical Provider, MD   Multiple Vitamins-Minerals (THERAPEUTIC MULTIVITAMIN-MINERALS) tablet Take 1 tablet by mouth daily   Yes Historical Provider, MD   loratadine (CLARITIN) 10 MG tablet Take 10 mg by mouth at bedtime   Yes Historical Provider, MD       Past Medical History:   Diagnosis Date    Anxiety     Hyperlipidemia     Hypertension         History reviewed. No pertinent surgical history. No Known Allergies    Social History:  Reviewed. reports that he has quit smoking. He has never used smokeless tobacco.     Family History:  Reviewed. Reviewed. No family history of SCD. Relevant and available labs, and cardiovascular diagnostics reviewed. Reviewed. Recent Labs     03/13/22  1559 03/14/22  0613    142   K 4.9 4.3    107   CO2 24 23   BUN 26* 29*   CREATININE 1.3 0.9     Recent Labs     03/13/22  1559 03/14/22  0613   WBC 8.7 5.5   HGB 14.2 12.9*   HCT 42.3 38.5*   MCV 91.6 91.7    171     Estimated Creatinine Clearance: 70 mL/min (based on SCr of 0.9 mg/dL). No results found for: BNP    I independently reviewed all cardiac tracing from cardiac telemetry.     I independently reviewed relevant and available cardiac diagnostic tests ECG, CXR, Echo, Stress test, Device interrogation, Holter, CT scan. All questions and concerns were addressed to the patient/family. Alternatives to my treatment were discussed. I have discussed the above stated plan and the patient verbalized understanding and agreed with the plan. NOTE: This report was transcribed using voice recognition software. Every effort was made to ensure accuracy, however, inadvertent computerized transcription errors may be present.      Rome Abernathy MD, MPH  [facility]   Office: [phone]  Fax: [phone]

## 2022-03-14 NOTE — PROGRESS NOTES
Pt alert and oriented x4. NIHSS 0. NSR on telemetry. Pt high fall risk and refusing all high fall risk precautions. This RN educated the patient on the risk of falls while being hospitalized and refusal form signed by patient. Orthostatics complete. Call light within reach and pt denies any further needs.

## 2022-03-14 NOTE — PROGRESS NOTES
Spoke with Dr. Nathaniel Keyes regarding stress testing. Awaiting results from vascular studies. Stress test on hold at this time. Will update pts nurse.

## 2022-03-15 PROBLEM — I82.409 DVT OF LEG (DEEP VENOUS THROMBOSIS) (HCC): Status: ACTIVE | Noted: 2022-03-15

## 2022-03-15 LAB
APTT: 47.1 SEC (ref 26.2–38.6)
APTT: 91.4 SEC (ref 26.2–38.6)
LEFT VENTRICULAR EJECTION FRACTION MODE: NORMAL
LV EF: 55 %
LV EF: 57 %
LVEF MODALITY: NORMAL
ORGANISM: ABNORMAL
URINE CULTURE, ROUTINE: ABNORMAL

## 2022-03-15 PROCEDURE — 97530 THERAPEUTIC ACTIVITIES: CPT

## 2022-03-15 PROCEDURE — B2111ZZ FLUOROSCOPY OF MULTIPLE CORONARY ARTERIES USING LOW OSMOLAR CONTRAST: ICD-10-PCS | Performed by: INTERNAL MEDICINE

## 2022-03-15 PROCEDURE — C1894 INTRO/SHEATH, NON-LASER: HCPCS

## 2022-03-15 PROCEDURE — 99233 SBSQ HOSP IP/OBS HIGH 50: CPT | Performed by: INTERNAL MEDICINE

## 2022-03-15 PROCEDURE — 85730 THROMBOPLASTIN TIME PARTIAL: CPT

## 2022-03-15 PROCEDURE — 2580000003 HC RX 258: Performed by: INTERNAL MEDICINE

## 2022-03-15 PROCEDURE — 97162 PT EVAL MOD COMPLEX 30 MIN: CPT

## 2022-03-15 PROCEDURE — 2500000003 HC RX 250 WO HCPCS

## 2022-03-15 PROCEDURE — 93017 CV STRESS TEST TRACING ONLY: CPT | Performed by: INTERNAL MEDICINE

## 2022-03-15 PROCEDURE — 1200000000 HC SEMI PRIVATE

## 2022-03-15 PROCEDURE — 93458 L HRT ARTERY/VENTRICLE ANGIO: CPT

## 2022-03-15 PROCEDURE — 2580000003 HC RX 258

## 2022-03-15 PROCEDURE — 6370000000 HC RX 637 (ALT 250 FOR IP): Performed by: INTERNAL MEDICINE

## 2022-03-15 PROCEDURE — C1769 GUIDE WIRE: HCPCS

## 2022-03-15 PROCEDURE — A9502 TC99M TETROFOSMIN: HCPCS | Performed by: INTERNAL MEDICINE

## 2022-03-15 PROCEDURE — 99223 1ST HOSP IP/OBS HIGH 75: CPT | Performed by: INTERNAL MEDICINE

## 2022-03-15 PROCEDURE — 6360000004 HC RX CONTRAST MEDICATION: Performed by: INTERNAL MEDICINE

## 2022-03-15 PROCEDURE — 93458 L HRT ARTERY/VENTRICLE ANGIO: CPT | Performed by: INTERNAL MEDICINE

## 2022-03-15 PROCEDURE — 99152 MOD SED SAME PHYS/QHP 5/>YRS: CPT

## 2022-03-15 PROCEDURE — 4A023N7 MEASUREMENT OF CARDIAC SAMPLING AND PRESSURE, LEFT HEART, PERCUTANEOUS APPROACH: ICD-10-PCS | Performed by: INTERNAL MEDICINE

## 2022-03-15 PROCEDURE — 6360000002 HC RX W HCPCS

## 2022-03-15 PROCEDURE — 2709999900 HC NON-CHARGEABLE SUPPLY

## 2022-03-15 PROCEDURE — 3430000000 HC RX DIAGNOSTIC RADIOPHARMACEUTICAL: Performed by: INTERNAL MEDICINE

## 2022-03-15 PROCEDURE — 36415 COLL VENOUS BLD VENIPUNCTURE: CPT

## 2022-03-15 PROCEDURE — 97165 OT EVAL LOW COMPLEX 30 MIN: CPT

## 2022-03-15 PROCEDURE — 78452 HT MUSCLE IMAGE SPECT MULT: CPT | Performed by: INTERNAL MEDICINE

## 2022-03-15 PROCEDURE — 99152 MOD SED SAME PHYS/QHP 5/>YRS: CPT | Performed by: INTERNAL MEDICINE

## 2022-03-15 RX ADMIN — TETROFOSMIN 30 MILLICURIE: 1.38 INJECTION, POWDER, LYOPHILIZED, FOR SOLUTION INTRAVENOUS at 10:11

## 2022-03-15 RX ADMIN — CETIRIZINE HYDROCHLORIDE 10 MG: 10 TABLET, FILM COATED ORAL at 20:37

## 2022-03-15 RX ADMIN — Medication 10 ML: at 08:59

## 2022-03-15 RX ADMIN — Medication 10 ML: at 20:37

## 2022-03-15 RX ADMIN — SERTRALINE 50 MG: 50 TABLET, FILM COATED ORAL at 08:59

## 2022-03-15 RX ADMIN — IOPAMIDOL 59 ML: 755 INJECTION, SOLUTION INTRAVENOUS at 15:22

## 2022-03-15 RX ADMIN — ACETAMINOPHEN 650 MG: 325 TABLET ORAL at 20:37

## 2022-03-15 RX ADMIN — APIXABAN 5 MG: 5 TABLET, FILM COATED ORAL at 18:36

## 2022-03-15 RX ADMIN — DIPHENHYDRAMINE HYDROCHLORIDE 25 MG: 25 TABLET ORAL at 20:37

## 2022-03-15 RX ADMIN — AMLODIPINE BESYLATE 10 MG: 5 TABLET ORAL at 08:59

## 2022-03-15 RX ADMIN — ATORVASTATIN CALCIUM 10 MG: 10 TABLET, FILM COATED ORAL at 08:59

## 2022-03-15 ASSESSMENT — PAIN SCALES - GENERAL
PAINLEVEL_OUTOF10: 0
PAINLEVEL_OUTOF10: 3
PAINLEVEL_OUTOF10: 0

## 2022-03-15 NOTE — PROGRESS NOTES
Hypertension. has no past surgical history on file. Restrictions  Restrictions/Precautions  Restrictions/Precautions: Fall Risk (HIGH FALL RISK)  Required Braces or Orthoses?: No  Position Activity Restriction  Other position/activity restrictions: Per H&P \"78 y.o. male who presents to the emergency department complaining of dizziness on and off for several months. He specifies it is not the room that is spinning, rather that he feels dizzy/lightheaded when he stands up quickly from a seated position. He also reports that even just short distances to the mailbox makes him feel very short of breath. He reports a mild occipital headache with radiation into the neck. This is been going on intermittently for months as well. He denies any preceding injury. Denies visual disturbances, back pain, neck stiffness, chest pain, cough, fever, chills, palpitations, hemoptysis, abdominal pain. Does report some increased swelling in the bilateral lower extremities over the past few weeks. \"    Subjective   General  Chart Reviewed: Yes  Patient assessed for rehabilitation services?: Yes  Additional Pertinent Hx: PMH: Anxiety, Hyperlipidemia, and Hypertension. Family / Caregiver Present: Yes (3 family members)  Referring Practitioner: Kaitlin Peña MD  Diagnosis: Syncope and collapse  Subjective  Subjective: Pt supine in bed upon arrival, pleasant and agreeable to OT evaluation and treat  Patient Currently in Pain: Denies  Vital Signs  BP: (!) 171/79 (post ambulation RN notified.  returns to 149/74 with seated rest)  Orthostatic B/P and Pulse?: Yes  Blood Pressure Lyin/76  Pulse Lyin PER MINUTE  Blood Pressure Sittin/73  Pulse Sittin PER MINUTE  Blood Pressure Standin/79  Pulse Standin PER MINUTE  Patient Currently in Pain: Denies  Orthostatic B/P and Pulse?: Yes    Social/Functional History  Social/Functional History  Lives With: Spouse  Type of Home: House  Home Layout: One level  Home Access: Stairs to enter with rails  Entrance Stairs - Number of Steps: 1 steps  Bathroom Shower/Tub: Walk-in shower  Bathroom Toilet: Handicap height  Bathroom Equipment: Grab bars in [facility] chair  Home Equipment: Rolling walker,Cane  ADL Assistance: Independent  Homemaking Assistance: Independent  Homemaking Responsibilities: Yes  Ambulation Assistance: Independent  Transfer Assistance: Independent  Active : Yes  Occupation: Retired  Additional Comments: pt reports 1 fall in the last 6 months- pt reports that he has balance deficits for several years. Pt has been having dizzy spells for the last year. Pt does not feel that the two are connected.        Objective   Vision: Impaired  Vision Exceptions: Wears glasses at all times  Hearing: Exceptions to [employer]  Hearing Exceptions: Bilateral hearing aid    Orientation  Overall Orientation Status: Within Normal Limits  Observation/Palpation  Posture: Fair (rounded shoulders, forward head)  Balance  Sitting Balance: Supervision  Standing Balance: Stand by assistance  Standing Balance  Time: 15 minutes total  Activity: functional mobility/transfers  Comment: no LOB, no device, slight postural sway but no physical assistance needed  Functional Mobility  Functional - Mobility Device: No device  Activity: Other  Assist Level: Stand by assistance  Functional Mobility Comments: Pt ambulated 600' in hospital hallway with no device and SBA, slight SOB noted at EOS, 02 WFL and pt recovered with seated rest break  ADL  Additional Comments: pt declined ADL participation this date  Tone RUE  RUE Tone: Normotonic  Tone LUE  LUE Tone: Normotonic  Coordination  Movements Are Fluid And Coordinated: Yes     Bed mobility  Supine to Sit: Modified independent  Sit to Supine: Modified independent  Scooting: Modified independent  Comment: HOB elevated  Transfers  Sit to stand: Stand by assistance  Stand to sit: Stand by assistance  Cognition  Overall Cognitive Status: WFL  Perception  Overall Perceptual Status: WFL  Sensation  Overall Sensation Status: Impaired (pt c/o N/T on tops of feet)  LUE AROM (degrees)  LUE AROM : WFL  Left Hand AROM (degrees)  Left Hand AROM: WFL  RUE AROM (degrees)  RUE AROM : WFL  Right Hand AROM (degrees)  Right Hand AROM: WFL     Plan   Plan  Plan Comment: Pt at occupational baseline and demo the safety and endurance needed to return home safely    G-Code     OutComes Score                                                  AM-PAC Score        AM-PAC Inpatient Daily Activity Raw Score: 24 (03/15/22 1408)  AM-PAC Inpatient ADL T-Scale Score : 57.54 (03/15/22 1408)  ADL Inpatient CMS 0-100% Score: 0 (03/15/22 1408)  ADL Inpatient CMS G-Code Modifier : [facility] (03/15/22 1408)    Goals  Short term goals  Time Frame for Short term goals: no skilled OT services recommended at this time, pt functioning at occupational baseline  Patient Goals   Patient goals : to return home       Therapy Time   Individual Concurrent Group Co-treatment   Time In 1314         Time Out 1358         Minutes 44         Timed Code Treatment Minutes: 34 Minutes       [place], OT

## 2022-03-15 NOTE — CONSULTS
[address]  H+P  Consult  OP Visit  FU Visit   CC HPI   Abnormal Stress Presents with dizziness, le edema and sob. Denies cp. Reports marked sob with exertion, unable to walk to mailbox without break. New finding recently. Consulted for abnormal stress test revealing apical inferior scar with EF of 57% as well as TID. Found to be in AF as well and EP consulted. Patient also with bilateral DVT without evidence for PE. HISTORY/ALLERGY/ROS   MEDHx  has a past medical history of Anxiety, Hyperlipidemia, and Hypertension. SURGHx  has no past surgical history on file. SOCHx  reports that he has quit smoking. He has never used smokeless tobacco.   FAMHx family history is not on file. ALLERG Patient has no known allergies.    ROS Full ROS obtained and negative except as mentioned in HPI   MEDICATIONS   Norvasc 10, lipitor 10      PHYSICAL EXAM   Vitals Vitals:    03/15/22 1130   BP: 139/75   Pulse: 64   Resp: 18   Temp: 97.6 °F (36.4 °C)   SpO2: 98%      Gen Alert, coop, no distress Heart  irreg irreg   Head NC, AT, no abnorm Abd  Soft, NT, +BS, no mass, no OM   Eyes PER, conj/corn clear Ext  Ext nl, AT, no C/C/E   Nose Nares nl, no drain, NT Pulse 2+ and symmetric   Throat Lips, mucosa, tongue nl Skin Col/text/turg nl, no vis rash/les   Neck S/S, TM, NT, no bruit/JVD Psych Nl mood and affect   Lung CTA-B, unlabored, no DTP Lymph   No cervical or axillary LA   Ch wall NT, no deform Neuro  Nl gross M/S exam      ASSESSMENT AND PLAN   *Abnormal stress test  Status Stress with apical inferior scar and TID suggestive MVD  Plan LHC, R/B/O discussed  *AF  Status New onset  Plan Per EP   Definitive AC post cath  *DVT  Status Bilateral  Plan Definitive AC post cath

## 2022-03-15 NOTE — PLAN OF CARE
Problem: Falls - Risk of:  Goal: Absence of physical injury  Description: Absence of physical injury  Outcome: Ongoing     Problem: HEMODYNAMIC STATUS  Goal: Patient has stable vital signs and fluid balance  Outcome: Ongoing     Problem: ACTIVITY INTOLERANCE/IMPAIRED MOBILITY  Goal: Mobility/activity is maintained at optimum level for patient  Outcome: Ongoing     Problem: Musculor/Skeletal Functional Status  Goal: Highest potential functional level  Outcome: Ongoing

## 2022-03-15 NOTE — PROGRESS NOTES
Progress Note - Dr. Mainor Still - Internal Medicine  PCP: Sarahi Hill [address] [facility] / [place] [id number] [facility] Day: 2  Code Status: Full Code  Current Diet: Diet NPO        CC: follow up on medical issues    Subjective:   Rishi Dutton is a 78 y.o. male. Pt seen and examined  Chart reviewed since last visit, labs and imaging below        Doing ok  Back in sinus now  Appreciate EP eval  For GXT this am    Pt also with new DVT  Currently on heparin drip    He denies chest pain, denies shortness of breath, denies nausea,  denies emesis. 10 system Review of Systems is reviewed with patient, and pertinent positives are noted in HPI above . Otherwise, Review of systems is negative. I have reviewed the patient's medical and social history in detail and updated the computerized patient record. To recap: He  has a past medical history of Anxiety, Hyperlipidemia, and Hypertension. . He  has no past surgical history on file. Marty Cameron He  reports that he has quit smoking. He has never used smokeless tobacco..        Active Hospital Problems    Diagnosis Date Noted    DVT of leg (deep venous thrombosis) (HCC) [I82.409] 03/15/2022    Atrial fibrillation ([address] [I48.91] 03/14/2022    Lower extremity edema [R60.0] 03/14/2022    Syncope and collapse [R55] 03/13/2022    HTN (hypertension) [I10] 03/13/2022    High cholesterol [E78.00] 03/13/2022    Depression [F32. A] 03/13/2022       Current Facility-Administered Medications: technetium tetrofosmin (Tc-MYOVIEW) injection 30 millicurie, 30 millicurie, IntraVENous, ONCE PRN  heparin (porcine) injection 7,070 Units, 80 Units/kg, IntraVENous, PRN  heparin (porcine) injection 3,540 Units, 40 Units/kg, IntraVENous, PRN  heparin 25,000 units in dextrose 5% 250 mL (premix) infusion, 5-30 Units/kg/hr, IntraVENous, Continuous  cetirizine (ZYRTEC) tablet 10 mg, 10 mg, Oral, Nightly  diphenhydrAMINE (BENADRYL) tablet 25 mg, 25 mg, Oral, Nightly PRN  sertraline (ZOLOFT) tablet 50 mg, 50 mg, Oral, Daily  amLODIPine (NORVASC) tablet 10 mg, 10 mg, Oral, Daily  atorvastatin (LIPITOR) tablet 10 mg, 10 mg, Oral, Daily  perflutren lipid microspheres (DEFINITY) injection 1.65 mg, 1.5 mL, IntraVENous, ONCE PRN  sodium chloride flush 0.9 % injection 5-40 mL, 5-40 mL, IntraVENous, 2 times per day  sodium chloride flush 0.9 % injection 10 mL, 10 mL, IntraVENous, PRN  0.9 % sodium chloride infusion, 25 mL, IntraVENous, PRN  potassium chloride (KLOR-CON M) extended release tablet 40 mEq, 40 mEq, Oral, PRN **OR** potassium bicarb-citric acid (EFFER-K) effervescent tablet 40 mEq, 40 mEq, Oral, PRN **OR** potassium chloride 10 mEq/100 mL IVPB (Peripheral Line), 10 mEq, IntraVENous, PRN  ondansetron (ZOFRAN-ODT) disintegrating tablet 4 mg, 4 mg, Oral, Q8H PRN **OR** ondansetron (ZOFRAN) injection 4 mg, 4 mg, IntraVENous, Q6H PRN  magnesium hydroxide (MILK OF MAGNESIA) 400 MG/5ML suspension 30 mL, 30 mL, Oral, Daily PRN  acetaminophen (TYLENOL) tablet 650 mg, 650 mg, Oral, Q6H PRN **OR** acetaminophen (TYLENOL) suppository 650 mg, 650 mg, Rectal, Q6H PRN  hydrALAZINE (APRESOLINE) injection 10 mg, 10 mg, IntraVENous, Q6H PRN  0.9 % sodium chloride bolus, 500 mL, IntraVENous, PRN         Objective:  BP (!) 166/74   Pulse 64   Temp 98 °F (36.7 °C) (Oral)   Resp 18   Ht 5' 10\" (1.778 m)   Wt 194 lb 14.4 oz (88.4 kg)   SpO2 98%   BMI 27.97 kg/m²      Patient Vitals for the past 24 hrs:   BP Temp Temp src Pulse Resp SpO2   03/15/22 0715 (!) 166/74 98 °F (36.7 °C) Oral 64 18 98 %   03/15/22 0445 128/75   60  97 %   03/15/22 0425 (!) 164/83 96.9 °F (36.1 °C) Temporal 60 18 97 %   03/15/22 0015 (!) 151/89 97 °F (36.1 °C) Temporal 62 18 98 %   03/14/22 2053      97 %   03/14/22 1915 (!) 168/88 97 °F (36.1 °C) Temporal 71 18 97 %   03/14/22 1730    67     03/14/22 1545 (!) 145/81 97.8 °F (36.6 °C) Temporal 73 16 98 %   03/14/22 1330 (!) 153/85 98 °F (36.7 °C) Temporal 92 16 96 % Patient Vitals for the past 96 hrs (Last 3 readings):   Weight   03/13/22 2039 194 lb 14.4 oz (88.4 kg)   03/13/22 1441 165 lb (74.8 kg)         No intake or output data in the 24 hours ending 03/15/22 0840      Physical Exam:   Adolph Jo as above  General appearance: alert, appears stated age and cooperative    Head: Normocephalic, without obvious abnormality, atraumatic    Lungs: clear to auscultation bilaterally    Heart: regular rate and rhythm, S1, S2 normal, no murmur    Abdomen: soft, non-tender; bowel sounds normal; no masses, no organomegaly    Extremities: extremities normal, atraumatic, no cyanosis, 1+ edema R leg    Labs:  Lab Results   Component Value Date    WBC 6.0 03/14/2022    HGB 14.0 03/14/2022    HCT 41.7 03/14/2022     03/14/2022    ALT 14 03/14/2022    AST 17 03/14/2022     03/14/2022    K 4.3 03/14/2022     03/14/2022    CREATININE 0.9 03/14/2022    BUN 29 (H) 03/14/2022    CO2 23 03/14/2022    LABMICR YES 03/13/2022     Lab Results   Component Value Date    TROPONINI <0.01 03/14/2022       Recent Imaging Results are Reviewed:  Echo Complete    Result Date: 3/14/2022  Transthoracic Echocardiography Report (TTE)  Demographics   Patient Name       ANTE Jamey Patiño   Date of Study      03/14/2022         Gender              Male   Patient Number     [id number]         Date of Birth       1940   Visit Number       [id number]          Age                 78 year(s)   Accession Number   [id number]         Room Number         1974   Corporate ID       [id number]            Sonographer         Maryann Martins RVT   Ordering Physician Pam Smith,  Interpreting        Isela Martin DO, MD                 Physician  Procedure Type of Study   TTE procedure:ECHOCARDIOGRAM COMPLETE 2D W DOPPLER W COLOR.   Procedure Date Date: 03/14/2022 Start: 10:30 AM Study Location: [facility] - Echo Lab Technical Quality: Adequate visualization Indications:Syncope. Patient Status: Routine Height: 70 inches Weight: 194 pounds BSA: 2.06 m2 BMI: 27.84 kg/m2 HR: 60 bpm BP: 137/78 mmHg  Conclusions   Summary  Global left ventricular function is normal with ejection fraction estimated  from 50 % to 55 %. Normal left ventricular wall thickness. Mild mitral & tricuspid regurgitation is present. PASP 23 mmHg. Signature   ------------------------------------------------------------------  Electronically signed by Jose Antonio Pool DO (Interpreting  physician) on 03/14/2022 at 05:49 PM  ------------------------------------------------------------------   Findings   Left Ventricle  Global left ventricular function is normal with ejection fraction estimated  from 50 % to 55 %. Normal left ventricular wall thickness. Left ventricle size is normal.  There is reversal of E/A inflow velocities across the mitral valve. Mitral Valve  Mitral valve is structurally normal.  Mild mitral regurgitation is present. Left Atrium  The left atrium is normal in size. Aortic Valve  The aortic valve is structurally normal. There is no significant aortic  valve regurgitation or stenosis. Aorta  The aortic root is normal in size. Right Ventricle  The right ventricular size is normal.   Tricuspid Valve  Tricuspid valve is structurally normal.  Mild tricuspid regurgitation. Right Atrium  The right atrium is normal in size. Pulmonic Valve  The pulmonic valve is not well visualized. There is no evidence of pulmonic valve regurgitation or stenosis. Pericardial Effusion  No pericardial effusion noted. Pleural Effusion  No pleural effusion noted. Miscellaneous  IVC is normal in size (< 2.1 cm) and collapses > 50% with respiration  consistent with normal RA pressure (3 mmHg).   M-Mode/2D Measurements (cm)   LV Diastolic Dimension: 5.3 cm LV Systolic Dimension: 9.65 cm  LV Septum Diastolic: 5.88 cm  LV PW Diastolic: 7.68 cm AO Root Dimension: 3.4 cm                                  LA Area: 18.1 cm2  LVOT: 2.1 cm                   LA volume/Index: 45.8 ml /22 ml/m2  Doppler Measurements   AV Peak Velocity: 131 cm/s     MV Peak E-Wave: 50.2 cm/s  AV Peak Gradient: 6.86 mmHg    MV Peak A-Wave: 102 cm/s  AV Mean Gradient: 4 mmHg       MV E/A Ratio: 0.49  LVOT Peak Velocity: 105 cm/s  AV Area (Continuity):3.33 cm2   TR Velocity:222 cm/s  TR Gradient:19.71 mmHg  Estimated RAP:3 mmHg  Estimated RVSP: 23 mmHg  E' Septal Velocity: 6.85 cm/s  E' Lateral Velocity: 9.68 cm/s  E/E' ratio: 6.3   Aortic Valve   Peak Velocity: 131 cm/s     Mean Velocity: 89 cm/s  Peak Gradient: 6.86 mmHg    Mean Gradient: 4 mmHg  Area (continuity): 3.33 cm2  AV VTI: 28.8 cm  Aorta   Aortic Root: 3.4 cm     Aortic Arch: 2.9 cm  Ascending Aorta: 3.6 cm  LVOT Diameter: 2.1 cm      CT HEAD WO CONTRAST    Result Date: 3/13/2022  EXAMINATION: CT OF THE HEAD WITHOUT CONTRAST  3/13/2022 4:24 pm TECHNIQUE: CT of the head was performed without the administration of intravenous contrast. Dose modulation, iterative reconstruction, and/or weight based adjustment of the mA/kV was utilized to reduce the radiation dose to as low as reasonably achievable. COMPARISON: None. HISTORY: ORDERING SYSTEM PROVIDED HISTORY: dizziness TECHNOLOGIST PROVIDED HISTORY: Reason for exam:->dizziness Has a \"code stroke\" or \"stroke alert\" been called? ->No Decision Support Exception - unselect if not a suspected or confirmed emergency medical condition->Emergency Medical Condition (MA) Reason for Exam: dizziness Relevant Medical/Surgical History: Dizziness (c/o dizzyness on and off for months) FINDINGS: BRAIN/VENTRICLES: There is no acute intracranial hemorrhage, mass effect or midline shift. No abnormal extra-axial fluid collection. The gray-white differentiation is maintained without evidence of an acute infarct. There is no evidence of hydrocephalus.  There are chronic atrophic changes consistent with the patient's age. ORBITS: The visualized portion of the orbits demonstrate no acute abnormality. SINUSES: The visualized paranasal sinuses and mastoid air cells demonstrate no acute abnormality. SOFT TISSUES/SKULL:  No acute abnormality of the visualized skull or soft tissues. No acute intracranial abnormality. XR CHEST PORTABLE    Result Date: 3/13/2022  EXAMINATION: ONE XRAY VIEW OF THE CHEST 3/13/2022 2:43 pm COMPARISON: None. HISTORY: ORDERING SYSTEM PROVIDED HISTORY: dizzy TECHNOLOGIST PROVIDED HISTORY: Reason for exam:->dizzy Reason for Exam: Dizziness (c/o dizzyness on and off for months) FINDINGS: No tracheal or mediastinal shift was noted. No cardiomegaly, pneumonia, interstitial edema or pleural effusions were identified. No hilar mass was noted. The regional skeleton was unremarkable. No cardiomegaly, pneumonia or interstitial edema. CT CHEST PULMONARY EMBOLISM W CONTRAST    Result Date: 3/14/2022  EXAMINATION: CTA OF THE CHEST 3/14/2022 2:54 pm TECHNIQUE: CTA of the chest was performed after the administration of intravenous contrast.  Multiplanar reformatted images are provided for review. MIP images are provided for review. Dose modulation, iterative reconstruction, and/or weight based adjustment of the mA/kV was utilized to reduce the radiation dose to as low as reasonably achievable. COMPARISON: 2012. HISTORY: ORDERING SYSTEM PROVIDED HISTORY: R/O PE, Vasc Study positive for bilat DVT TECHNOLOGIST PROVIDED HISTORY: Reason for exam:->R/O PE, Vasc Study positive for bilat DVT Reason for Exam: R/O PE, Vasc Study positive for bilat DVT FINDINGS: Pulmonary Arteries: Pulmonary arteries are adequately opacified for evaluation. No evidence of intraluminal filling defect to suggest pulmonary embolism. Main pulmonary artery is normal in caliber. Mediastinum: No evidence of mediastinal lymphadenopathy. The heart and pericardium demonstrate no acute abnormality.   There is no acute abnormality of the thoracic aorta. Lungs/pleura: The lungs are without acute process. No focal consolidation or pulmonary edema. No evidence of pleural effusion or pneumothorax. 2-3 mm nodule right upper lobe just above the minor fissure is seen series 9, image 144. This is unchanged from prior study 2012, faintly demonstrated prior CT 3 image 42. Upper Abdomen: Limited images of the upper abdomen demonstrate no acute abnormality. A simple cyst involves the pancreatic tail measuring 1.4 cm. This is likely incidental.  Renal cysts also incidentally noted greater on the left. The gallbladder is been removed. Soft Tissues/Bones: No acute bone or soft tissue abnormality. No evidence of pulmonary embolism or acute pulmonary abnormality. 2-3 mm right upper lobe nodule likely incidental.  No further follow-up is warranted. VL Extremity Venous Bilateral    Result Date: 3/14/2022  Lower Extremities DVT Study  Demographics   Patient Name       ANTE Nataleelia Buster   Date of Study      03/14/2022         Gender              Male   Patient Number     [id number]         Date of Birth       1940   Visit Number       [id number]          Age                 78 year(s)   Accession Number   [id number]         Room Number         4163   Corporate ID       [id number]            [place] [address], [facility],                                                            [address]   Ordering Physician Kyle Nair,  Interpreting        I Vascular                     MD                 Physician           Angelo Pablo MD,                                                            [facility], [address]  Procedure Type of Study:   Veins:Lower Extremities DVT Study, VASC EXTREMITY VENOUS DUPLEX BILATERAL. Vascular Sonographer Report  Additional Indications:Swelling Impressions Right Impression Acute partially occluding deep vein thrombosis involving the right popliteal vein. Proximal tip is poorly attached.  Acute totally occluding deep vein thrombosis involving one peroneal vein proximal to zone 7.5 and multiple soleal veins zone 6-7 right calf. Acute partially and totally occluding deep vein thrombosis involving the right PTV's throughout. Acute totally occluding superficial venous thrombosis involving the right lesser saphenous vein zone 5-6. No other evidence of deep vein or superficial vein thrombosis involving the right lower extremity. Left Impression Acute partially and totally occluding deep vein thrombosis involving the left PTV's zone 6-7. No other evidence of deep vein or superficial vein thrombosis involving the left lower extremity. Verbal to Yulissa FULLER. Conclusions   Summary   Acute partially occluding deep vein thrombosis involving the right popliteal  vein. Proximal tip is poorly attached. Acute totally occluding deep vein thrombosis involving one peroneal vein  proximal to zone 7.5 and multiple soleal veins right calf. Acute partially and totally occluding deep vein thrombosis involving the  right PTV's throughout. Acute totally occluding superficial venous thrombosis involving the right  lesser saphenous vein zone. Acute partially and totally occluding deep vein thrombosis involving the  left PTV's zone. Signature   ------------------------------------------------------------------  Electronically signed by Nickolas Rutherford MD, [facility] [place], [address]  (Interpreting physician) on 03/14/2022 at 01:39 PM  ------------------------------------------------------------------  Patient Status:Routine. [address] [place] [address] - Vascular Lab. Technical Quality:Adequate visualization. Velocities are measured in cm/s ; Diameters are measured in mm Right Lower Extremities DVT Study Measurements Right 2D Measurements +------------------------+----------+---------------+----------+ ! Location                ! Visualized! Compressibility! Thrombosis! +------------------------+----------+---------------+----------+ ! Sapheno Femoral Junction! Yes !Yes            !None      ! +------------------------+----------+---------------+----------+ ! GSV Thigh               ! Yes       ! Yes            ! None      ! +------------------------+----------+---------------+----------+ ! Common Femoral          !Yes       ! Yes            ! None      ! +------------------------+----------+---------------+----------+ ! Prox Femoral            !Yes       ! Yes            ! None      ! +------------------------+----------+---------------+----------+ ! Mid Femoral             !Yes       ! Yes            ! None      ! +------------------------+----------+---------------+----------+ ! Dist Femoral            !Yes       ! Yes            ! None      ! +------------------------+----------+---------------+----------+ ! Deep Femoral            !Yes       ! Yes            ! None      ! +------------------------+----------+---------------+----------+ ! Popliteal               !Yes       ! Partial        !Acute     ! +------------------------+----------+---------------+----------+ ! GSV Below Knee          ! Yes       ! Yes            ! None      ! +------------------------+----------+---------------+----------+ ! Gastroc                 ! Yes       ! Yes            ! None      ! +------------------------+----------+---------------+----------+ ! Soleal                  !Yes       ! No             !Acute     ! +------------------------+----------+---------------+----------+ ! PTV                     ! Yes       ! No             !Acute     ! +------------------------+----------+---------------+----------+ ! Peroneal                !Yes       ! No             !Acute     ! +------------------------+----------+---------------+----------+ ! SSV                     ! Yes       ! No             !Acute     ! +------------------------+----------+---------------+----------+ Right Doppler Measurements +------------------------+------+------+------------+ ! Location                ! Signal!Reflux! Reflux (sec)! +------------------------+------+------+------------+ ! Sapheno Femoral Junction! Phasic!      !            ! +------------------------+------+------+------------+ ! Common Femoral          !Phasic!      !            ! +------------------------+------+------+------------+ ! Prox Femoral            !Phasic!      !            ! +------------------------+------+------+------------+ ! Deep Femoral            !Phasic!      !            ! +------------------------+------+------+------------+ ! Popliteal               !Phasic!      !            ! +------------------------+------+------+------------+ Left Lower Extremities DVT Study Measurements Left 2D Measurements +------------------------+----------+---------------+----------+ ! Location                ! Visualized! Compressibility! Thrombosis! +------------------------+----------+---------------+----------+ ! Sapheno Femoral Junction! Yes       ! Yes            ! None      ! +------------------------+----------+---------------+----------+ ! GSV Thigh               ! Yes       ! Yes            ! None      ! +------------------------+----------+---------------+----------+ ! Common Femoral          !Yes       ! Yes            ! None      ! +------------------------+----------+---------------+----------+ ! Prox Femoral            !Yes       ! Yes            ! None      ! +------------------------+----------+---------------+----------+ ! Mid Femoral             !Yes       ! Yes            ! None      ! +------------------------+----------+---------------+----------+ ! Dist Femoral            !Yes       ! Yes            ! None      ! +------------------------+----------+---------------+----------+ ! Deep Femoral            !Yes       ! Yes            ! None      ! +------------------------+----------+---------------+----------+ ! Popliteal               !Yes       ! Yes            ! None      ! +------------------------+----------+---------------+----------+ ! GSV Below Knee          ! Yes       ! Yes !None      ! +------------------------+----------+---------------+----------+ ! Gastroc                 ! Yes       ! Yes            ! None      ! +------------------------+----------+---------------+----------+ ! PTV                     ! Yes       ! No             !Acute     ! +------------------------+----------+---------------+----------+ ! Peroneal                !Yes       ! Yes            ! None      ! +------------------------+----------+---------------+----------+ ! SSV                     ! Yes       ! Yes            ! None      ! +------------------------+----------+---------------+----------+ Left Doppler Measurements +------------------------+------+------+------------+ ! Location                ! Signal!Reflux! Reflux (sec)! +------------------------+------+------+------------+ ! Sapheno Femoral Junction! Phasic!      !            ! +------------------------+------+------+------------+ ! Common Femoral          !Phasic!      !            ! +------------------------+------+------+------------+ ! Prox Femoral            !Phasic!      !            ! +------------------------+------+------+------------+ ! Deep Femoral            !Phasic!      !            ! +------------------------+------+------+------------+ ! Popliteal               !Phasic!      !            ! +------------------------+------+------+------------+      Lab Results   Component Value Date    GLUCOSE 95 03/14/2022     No results found for: POCGLU  BP (!) 166/74   Pulse 64   Temp 98 °F (36.7 °C) (Oral)   Resp 18   Ht 5' 10\" (1.778 m)   Wt 194 lb 14.4 oz (88.4 kg)   SpO2 98%   BMI 27.97 kg/m²     Assessment and Plan:  Principal Problem:    Syncope and collapse -Established problem. Stable. No recurrence. May be 2/2 PAfib. Plan: cont on tele. May need outpt Holter  Active Problems:    HTN (hypertension) -Established problem. Stable. 166/74  Plan: cont same meds    High cholesterol -Established problem. Stable. Plan: Continue present orders/plan. Depression -Established problem. Stable. Mood stable  Plan: Continue present orders/plan. Atrial fibrillation ([address] -Established problem. Improving. In sinus now  Plan: will need outpt holter. GXT pending. Will see if cards has a preference on anticoagulation    DVT of leg -New Problem to me. Plan: on iv heparin for now.   Likely to convert to Eliquis on d/c      (Please note that portions of this note were completed with a voice recognition program.  Efforts were made to edit the dictations but occasionally words are mis-transcribed.)        Zev Kang MD  3/15/2022

## 2022-03-15 NOTE — OP NOTE
[address]  Procedure Note    Procedure: [facility]  Indication: SOB, abnormal stress    Procedure Details:  Consent Access Bleed R Sedat Start Stop Versed Fentanyl Contrast Fluoro EBL Comp Spec   Yes RRA low MCSFC 1455 1515 3 100 59 2.7 <20 None None   *US Note: US guidance used to determine artery patency, size (>2mm), anatomic variations and ideal puncture location. Real-time ultrasound utilized concurrent with vascular needle entry into the artery. Image recorded. Findings:  Artery Findings/Result   LM 10% distal taper   LAD 30% prox involving D1, D1 40% ostial, mid bridging.    Cx Normal   RI N/A   RCA 20% mid   LVEDP 11   LVG 55%     Intervention:   None    Post Cath Dx:   Nonobstructive CAD

## 2022-03-15 NOTE — PROGRESS NOTES
[facility]   Electrophysiology Progress Note     Admit Date: 3/13/2022     Reason for follow up: Atrial fibrillation    HPI and Interval History: 78 y.o. male presented with presented to the hospital with intermittent dizziness and lightheadedness. Had atrial fibrillation on telemetry, appeared asymptomatic. Also found to have acute DVT of the right LE. Patient seen and examined. Clinical notes reviewed. Telemetry reviewed. No new complaint today. No major events overnight. Denies having chest pain, shortness of breath, dyspnea on exertion, Orthopnea, PND at the time of this visit. Converted to sinus rhythm. Assessment:   -Paroxysmal atrial fibrillation              New diagnosis  - Acute DVT    New diagnosis  -Lightheadedness and dizziness  -Shortness of breath  -Hypertension  -HLD    Plan:   -Patient noted to have atrial fibrillation on telemetry monitor yesterday. He has converted back to sinus rhythm. He felt good while in atrial fibrillation so I doubt his symptoms were related to atrial fibrillation.  -He denies syncope/faint.  - [id number] Score: 3  - TSH normal 2.97 .   -Heart rates were controlled during atrial fibrillation    -We will consider outpatient Holter monitoring to assess atrial fibrillation burden. If he has symptomatic atrial fibrillation, ablation can be considered.    -Association of symptoms with arrhythmia is essential. He reported no symptoms during his atrial fibrillation while hospitalized. -Now diagnosed with acute DVT and is on IV heparin.  -Consider DOAC's at discharge  -Echocardiography with normal ejection fraction. -Myoview stress test    - ? Etiology for dizziness and lightheadedness, other causes needs to be assessed/addressed by primary team   - Orthostatic BP     -Has been diagnosed with acute DVT  - ? Etiology. Patient reports that he was active and exercised every other day. No reports of long travel. ?   Hypercoagulable state, will defer to primary team.    - MATTSON:    Myoview abnormal and TID   Interventional cardiology evaluation. Discussed with nursing staff. Active Hospital Problems    Diagnosis Date Noted    DVT of leg (deep venous thrombosis) ([facility]) [I82.409] 03/15/2022    Atrial fibrillation ([address] [I48.91] 03/14/2022    Lower extremity edema [R60.0] 03/14/2022    Syncope and collapse [R55] 03/13/2022    HTN (hypertension) [I10] 03/13/2022    High cholesterol [E78.00] 03/13/2022    Depression [F32. A] 03/13/2022       Diagnostic studies:     ECG: Atrial fibrillation     Myoview:       Left ventricular cavity size is mildly dilated. The right ventricle is    normal in size.        Spect imaging demonstrates a small area of mildly decreased perfusion in the    apical inferior segment. The defect is fixed at rest and stress, consistent    with prior infarct.        Sum stress score of 5.    There is visual and calculated TID (transient ischemic dilation) of 1.39    with lexiscan.        Left ventricular ejection fraction is normal at 57%.        Myocardial perfusion is abnormal, consistent with prior infarct. High risk    scan given TID, which may indicate multivessel CAD or proximal LAD disease.           Echo:   Global left ventricular function is normal with ejection fraction estimated   from 50 % to 55 %. Normal left ventricular wall thickness. Mild mitral & tricuspid regurgitation is present. PASP 23 mmHg. D-Dimer: 8310   CTPA: No PE    LE Doppler:   Acute partially occluding deep vein thrombosis involving the right popliteal    vein.  Proximal tip is poorly attached.    Acute totally occluding deep vein thrombosis involving one peroneal vein    proximal to zone 7.5 and multiple soleal veins right calf.    Acute partially and totally occluding deep vein thrombosis involving the    right PTV's throughout.    Acute totally occluding superficial venous thrombosis involving the right    lesser saphenous vein ondansetron **OR** ondansetron, magnesium hydroxide, acetaminophen **OR** acetaminophen, hydrALAZINE, sodium chloride     Prior to Admission medications    Medication Sig Start Date End Date Taking? Authorizing Provider   sertraline (ZOLOFT) 50 MG tablet Take 50 mg by mouth daily   Yes Historical Provider, MD   amLODIPine (NORVASC) 10 MG tablet Take 10 mg by mouth daily   Yes Historical Provider, MD   atorvastatin (LIPITOR) 10 MG tablet Take 5 mg by mouth daily    Yes Historical Provider, MD   diphenhydrAMINE-APAP, sleep, (TYLENOL PM EXTRA STRENGTH)  MG tablet Take 1 tablet by mouth nightly as needed for Sleep   Yes Historical Provider, MD   Multiple Vitamins-Minerals (THERAPEUTIC MULTIVITAMIN-MINERALS) tablet Take 1 tablet by mouth daily   Yes Historical Provider, MD   loratadine (CLARITIN) 10 MG tablet Take 10 mg by mouth at bedtime   Yes Historical Provider, MD       Review of System:  [x] Full ROS obtained and negative except as mentioned in HPI    Relevant and available labs, and cardiovascular diagnostics reviewed. Reviewed. Recent Labs     03/13/22  1559 03/14/22  0613    142   K 4.9 4.3    107   CO2 24 23   BUN 26* 29*   CREATININE 1.3 0.9     Recent Labs     03/13/22  1559 03/14/22  0613 03/14/22  1422   WBC 8.7 5.5 6.0   HGB 14.2 12.9* 14.0   HCT 42.3 38.5* 41.7   MCV 91.6 91.7 91.4    171 172     Estimated Creatinine Clearance: 70 mL/min (based on SCr of 0.9 mg/dL). No results found for: BNP    I independently reviewed all cardiac tracing from cardiac telemetry. I independently reviewed relevant and available cardiac diagnostic tests ECG, CXR, Echo, Stress test, Device interrogation, Holter, CT scan. Outside medical records via Care everywhere reviewed and summarized in H&P above.      I have spent 35 minutes of face to face time with the patient with more than 50% spent counseling and coordinating care for this patient    Thank you for allowing me to participate in the care of Lukas Humphries     All questions and concerns were addressed to the patient/family. Alternatives to my treatment were discussed. I have discussed the above stated plan and the patient verbalized understanding and agreed with the plan. NOTE: This report was transcribed using voice recognition software. Every effort was made to ensure accuracy, however, inadvertent computerized transcription errors may be present.      Loree Solares MD, MPH  [facility]   Office: [phone]  Fax: [phone]

## 2022-03-15 NOTE — PROGRESS NOTES
Pt return from cath to room 3318 at . Right radial Cath site CDI, pulses +2 bilaterally, wrist compression device. 9ml air at transfer to start removing per orders at 1600. Vitals stable, patient aware, denies questions. Report from  Cath nurse. report from nurse lux. Will continue to monitor.

## 2022-03-15 NOTE — PROGRESS NOTES
Physical Therapy    Facility/Department: [address]  Initial Assessment    NAME: Kayla Villalba  : 1940  MRN: [id number]    Date of Service: 3/15/2022    Discharge Recommendations:  2-3 sessions per week   PT Equipment Recommendations  Equipment Needed: No    Kayla Villalba scored a 20/24 on the AM-PAC short mobility form. Current research shows that an AM-PAC score of 18 or greater is typically associated with a discharge to the patient's home setting. Based on the patient's AM-PAC score and their current functional mobility deficits, it is recommended that the patient have 2-3 sessions per week of Physical Therapy at d/c to increase the patient's independence. At this time, this patient demonstrates the endurance and safety to discharge home with  OP services and a follow up treatment frequency of 2-3x/wk. Please see assessment section for further patient specific details. If patient discharges prior to next session this note will serve as a discharge summary. Please see below for the latest assessment towards goals. Assessment   Body structures, Functions, Activity limitations: Decreased functional mobility ; Decreased balance  Assessment: Pt is an 79 yo male presenting with recurrent syncopal episodes and tolerates therapy evaluations fairly well this date. 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 Pt presents with elevated BP during session, RN notified and monitoring. Pt will continue to benefit from skilled PT services while in house to continue to assess activity tolerance with activity and continue education on fall prevention and balance strategies.   Treatment Diagnosis: impaired balance  Prognosis: Good  Decision Making: Medium Complexity  Exam: functional mobility  Clinical Presentation: stable  PT Education: Goals;PT Role;Plan of Care;General Safety  Patient Education: d/c recommendations--pt verbalizes understanding  Barriers to Learning: none  REQUIRES PT FOLLOW UP: Yes  Activity Tolerance  Activity Tolerance: Patient Tolerated treatment well       Patient Diagnosis(es): The primary encounter diagnosis was Near syncope. A diagnosis of MATTSON (dyspnea on exertion) was also pertinent to this visit. has a past medical history of Anxiety, Hyperlipidemia, and Hypertension. has no past surgical history on file. Restrictions  Restrictions/Precautions  Restrictions/Precautions: Fall Risk (HIGH FALL RISK)  Required Braces or Orthoses?: No  Position Activity Restriction  Other position/activity restrictions: Per H&P \"78 y.o. male who presents to the emergency department complaining of dizziness on and off for several months. He specifies it is not the room that is spinning, rather that he feels dizzy/lightheaded when he stands up quickly from a seated position. He also reports that even just short distances to the mailbox makes him feel very short of breath. He reports a mild occipital headache with radiation into the neck. This is been going on intermittently for months as well. He denies any preceding injury. Denies visual disturbances, back pain, neck stiffness, chest pain, cough, fever, chills, palpitations, hemoptysis, abdominal pain. Does report some increased swelling in the bilateral lower extremities over the past few weeks. \"  Vision/Hearing  Vision: Impaired  Vision Exceptions: Wears glasses at all times  Hearing: Exceptions to [facility]  Hearing Exceptions: Bilateral hearing aid     Subjective  General  Chart Reviewed: Yes  Patient assessed for rehabilitation services?: Yes  Response To Previous Treatment: Not applicable  Family / Caregiver Present: Yes (spouse and daughters)  Diagnosis: Syncope  Follows Commands: Within Functional Limits  General Comment  Comments: Pt supine in bed upon PT/OT arrival, denies pain while at rest and is agreeable to skilled therapy evaluations. Pain Screening  Patient Currently in Pain: Denies  Vital Signs  BP: (!) 171/79 (post ambulation RN notified. returns to 149/74 with seated rest)  Orthostatic B/P and Pulse?: Yes  Blood Pressure Lyin/76  Pulse Lyin PER MINUTE  Blood Pressure Sittin/73  Pulse Sittin PER MINUTE  Blood Pressure Standin/79  Pulse Standin PER MINUTE  Patient Currently in Pain: Denies  Orthostatic B/P and Pulse?: Yes     Social/Functional History  Social/Functional History  Lives With: Spouse  Type of Home: House  Home Layout: One level  Home Access: Stairs to enter with rails  Entrance Stairs - Number of Steps: 1 steps  Bathroom Shower/Tub: Walk-in shower  Bathroom Toilet: Handicap height  Bathroom Equipment: Grab bars in shower,Shower chair  Home Equipment: Rolling walker,Cane  ADL Assistance: Independent  Homemaking Assistance: Independent  Homemaking Responsibilities: Yes  Ambulation Assistance: Independent  Transfer Assistance: Independent  Active : Yes  Occupation: Retired  Additional Comments: pt reports 1 fall in the last 6 months- pt reports that he has balance deficits for several years. Pt has been having dizzy spells for the last year. Pt does not feel that the two are connected.   Cognition        Objective     Observation/Palpation  Posture: Fair (rounded shoulders, forward head)       Strength RLE  Strength RLE: WFL  Comment: as observed during functional mobility  Strength LLE  Strength LLE: WFL  Comment: as observed during functional mobility     Sensation  Overall Sensation Status: Impaired (pt c/o N/T on tops of feet)  Bed mobility  Supine to Sit: Modified independent  Sit to Supine: Modified independent  Scooting: Modified independent  Comment: HOB elevated  Transfers  Sit to Stand: Stand by assistance  Stand to sit: Stand by assistance  Ambulation  Ambulation?: Yes  Ambulation 1  Surface: level tile  Device: No Device  Assistance: Stand by assistance  Quality of Gait: wide JOHN, mild postural sway without overt LOB  Distance: 600'  Stairs/Curb  Stairs?: No     Balance  Posture: Good  Sitting - Static: Good (independent)  Sitting - Dynamic: Good (independent)  Standing - Static: Fair;+ (SBA)  Standing - Dynamic: Fair;+ (SBA)        Plan   Plan  Times per week: 1-2  Times per day: Daily  Current Treatment Recommendations: Strengthening,Functional Mobility Training,Transfer Training,Gait Training,Safety Education & Training,Balance Training,Stair training,Patient/Caregiver Education & Training  Safety Devices  Type of devices: Nurse notified,Patient at risk for falls (pt seated EOB upon departure, RN notified and monitoring.  Pt declines bed alarm)  Restraints  Initially in place: No    AM-PAC Score  AM-PAC Inpatient Mobility Raw Score : 20 (03/15/22 1415)  AM-PAC Inpatient T-Scale Score : 47.67 (03/15/22 1415)  Mobility Inpatient CMS 0-100% Score: 35.83 (03/15/22 1415)  Mobility Inpatient CMS G-Code Modifier : CJ (03/15/22 1415)          Goals  Short term goals  Time Frame for Short term goals: to be met by d/c  Short term goal 1: pt will complete STS with mod I  Short term goal 2: pt will complete stand step transfer with mod I  Short term goal 3: pt will ambulate x 500' with mod I  Short term goal 4: pt will tolerate formal balance assessment  Patient Goals   Patient goals : to go home       Therapy Time   Individual Concurrent Group Co-treatment   Time In       1314   Time Out       1358   Minutes       44         Timed Code Treatment Minutes:  29 minutes    Total Treatment Minutes:  44 minutes    [address] [facility] [address], PT     Joselo Fine DPT [id number]

## 2022-03-15 NOTE — PRE SEDATION
Brief Pre-Op Note/Sedation Assessment      Nitesh Keila  1940  [id number]  2:31 PM    Planned Procedure: Cardiac Catheterization Procedure  Post Procedure Plan: Return to same level of care  Consent: I have discussed with the patient and/or the patient representative the indication, alternatives, and the possible risks and/or complications of the planned procedure and the anesthesia methods. The patient and/or patient representative appear to understand and agree to proceed. Chief Complaint:   Anginal Equivalent  Dyspnea on Exertion  Dyspnea  Fatigue    Indications for Cath Procedure:   Presentation:  New Onset Angina <= 2 months, Worsening Angina and Suspected CAD   Anginal Classification within 2 weeks:  CCS III - Symptoms with everyday living activities, i.e., moderate limitation   Angina Symptoms Assessment:  Typical Chest Pain   Heart Failure Class within last 2 weeks:  No symptoms   Cardiovascular Instability:  Yes    Prior Ischemic Workup/Eval:   Pre-Procedural Medications: Yes: STATIN   Stress Test Completed? Yes:  Stress or Imaging Studies Performed (within ANY time period):   Type:  Stress Nuclear  Results:  Positive:  Myocardial Perfusion Defects (Nuclear) Extent of Ischemia:  High Risk (>3% annual death or MI)    Does Patient need surgery?  Cath Valve Surgery:  No    Pre-Procedure Medical History:   Vital Signs:  BP (!) 171/79 Comment: post ambulation RN notified. returns to 149/74 with seated rest  Pulse 64   Temp 97.6 °F (36.4 °C) (Temporal)   Resp 18   Ht 5' 10\" (1.778 m)   Wt 194 lb 14.4 oz (88.4 kg)   SpO2 98%   BMI 27.97 kg/m²    Allergies:  Reviewed in EMR   Medications:  Reviewed in EMR   Past Medical History:  Reviewed in EMR   Surgical History:  Reviewed in EMR    Pre-Sedation:   Pre-Sedation Documentation and Exam = I have assessed the patient and reviewed the H&P on the chart.    Prior History of Anesthesia Complications = none   Modified Mallampati = II (soft palate, uvula, fauces visible)   ASA Classification = Class 2 - A normal healthy patient with mild systemic disease    Kanika Scale:   Activity:  2 - Able to move 4 extremities voluntarily on command   Respiration:  2 - Able to breathe deeply and cough freely   Circulation:  2 - BP+/- 20mmHg of normal   Consciousness:  2 - Fully awake   Oxygen Saturation (color):  2 - Able to maintain oxygen saturation >92% on room air    Sedation/Anesthesia Plan:  Guard the patient's safety and welfare. Minimize physical discomfort and pain. Minimize negative psychological responses to treatment by providing sedation and analgesia and maximize the potential amnesia. Patient to meet pre-procedure discharge plan.     Medication Planned:  Midazolam intravenously and fentanyl intravenously    Patient is an appropriate candidate for plan of sedation:   Yes      Electronically signed by Viktoriya Foster MD on 3/15/2022 at 2:31 PM

## 2022-03-15 NOTE — CARE COORDINATION
Discharge Planning:  I have reviewed the patient's medical history in detail and updated the computerized patient record. Patient independent prior to admission. There are no needs identified at this time. If something specific is identified, please notify Discharge Planner.     Pt provided with [facility] PCP list per request.    Electronically signed by Jovan Yanez RN on 3/15/2022 at 12:03 PM

## 2022-03-15 NOTE — PLAN OF CARE
Pt denies pain & remains free from falls and injuries. Pt aware of treatment plan. This RN reinforced education r/t Heparin and risk for bleeding. Pt uses call light appropriately. Pt continues to refuse bed alarm and he is aware that he is considered a high fall risk. Pt has signed refusal for bed alarm r/t high fall risk in paper file. VSS. Call light within reach, bed in lowest position, door closed at pt's request, bedside table & urinal within reach.     Heparin not therapeutic - next draw at 0615    Problem: Falls - Risk of:  Goal: Will remain free from falls  Description: Will remain free from falls  Outcome: Ongoing  Goal: Absence of physical injury  Description: Absence of physical injury  Outcome: Ongoing     Problem: HEMODYNAMIC STATUS  Goal: Patient has stable vital signs and fluid balance  Outcome: Ongoing     Problem: ACTIVITY INTOLERANCE/IMPAIRED MOBILITY  Goal: Mobility/activity is maintained at optimum level for patient  Outcome: Ongoing     Problem: COMMUNICATION IMPAIRMENT  Goal: Ability to express needs and understand communication  Outcome: Ongoing

## 2022-03-15 NOTE — PROGRESS NOTES
Instructed on Lexiscan Stress Test Procedure including possible side effects/ adverse reactions. Patient verbalizes  understanding and denies having any questions . See [address] [place] [address] Cardiology

## 2022-03-16 VITALS
RESPIRATION RATE: 18 BRPM | WEIGHT: 197.6 LBS | TEMPERATURE: 97.9 F | BODY MASS INDEX: 28.29 KG/M2 | OXYGEN SATURATION: 95 % | HEIGHT: 70 IN | SYSTOLIC BLOOD PRESSURE: 136 MMHG | HEART RATE: 63 BPM | DIASTOLIC BLOOD PRESSURE: 84 MMHG

## 2022-03-16 DIAGNOSIS — I48.91 ATRIAL FIBRILLATION, UNSPECIFIED TYPE (HCC): Primary | ICD-10-CM

## 2022-03-16 LAB
ANION GAP SERPL CALCULATED.3IONS-SCNC: 8 MMOL/L (ref 3–16)
BASOPHILS ABSOLUTE: 0 K/UL (ref 0–0.2)
BASOPHILS RELATIVE PERCENT: 0.6 %
BUN BLDV-MCNC: 20 MG/DL (ref 7–20)
CALCIUM SERPL-MCNC: 9.2 MG/DL (ref 8.3–10.6)
CHLORIDE BLD-SCNC: 105 MMOL/L (ref 99–110)
CO2: 26 MMOL/L (ref 21–32)
CREAT SERPL-MCNC: 0.9 MG/DL (ref 0.8–1.3)
EOSINOPHILS ABSOLUTE: 0.1 K/UL (ref 0–0.6)
EOSINOPHILS RELATIVE PERCENT: 1.9 %
GFR AFRICAN AMERICAN: >60
GFR NON-AFRICAN AMERICAN: >60
GLUCOSE BLD-MCNC: 105 MG/DL (ref 70–99)
HCT VFR BLD CALC: 36.6 % (ref 40.5–52.5)
HEMOGLOBIN: 12.5 G/DL (ref 13.5–17.5)
LYMPHOCYTES ABSOLUTE: 1.4 K/UL (ref 1–5.1)
LYMPHOCYTES RELATIVE PERCENT: 27.7 %
MCH RBC QN AUTO: 31.5 PG (ref 26–34)
MCHC RBC AUTO-ENTMCNC: 34.2 G/DL (ref 31–36)
MCV RBC AUTO: 92 FL (ref 80–100)
MONOCYTES ABSOLUTE: 0.6 K/UL (ref 0–1.3)
MONOCYTES RELATIVE PERCENT: 11.7 %
NEUTROPHILS ABSOLUTE: 2.9 K/UL (ref 1.7–7.7)
NEUTROPHILS RELATIVE PERCENT: 58.1 %
PDW BLD-RTO: 13 % (ref 12.4–15.4)
PLATELET # BLD: 158 K/UL (ref 135–450)
PMV BLD AUTO: 7.8 FL (ref 5–10.5)
POTASSIUM SERPL-SCNC: 4.8 MMOL/L (ref 3.5–5.1)
RBC # BLD: 3.98 M/UL (ref 4.2–5.9)
SODIUM BLD-SCNC: 139 MMOL/L (ref 136–145)
WBC # BLD: 5.1 K/UL (ref 4–11)

## 2022-03-16 PROCEDURE — 2580000003 HC RX 258: Performed by: INTERNAL MEDICINE

## 2022-03-16 PROCEDURE — 6370000000 HC RX 637 (ALT 250 FOR IP): Performed by: INTERNAL MEDICINE

## 2022-03-16 PROCEDURE — 85025 COMPLETE CBC W/AUTO DIFF WBC: CPT

## 2022-03-16 PROCEDURE — 99233 SBSQ HOSP IP/OBS HIGH 50: CPT | Performed by: INTERNAL MEDICINE

## 2022-03-16 PROCEDURE — 80048 BASIC METABOLIC PNL TOTAL CA: CPT

## 2022-03-16 PROCEDURE — 36415 COLL VENOUS BLD VENIPUNCTURE: CPT

## 2022-03-16 RX ADMIN — Medication 10 ML: at 08:50

## 2022-03-16 RX ADMIN — ATORVASTATIN CALCIUM 10 MG: 10 TABLET, FILM COATED ORAL at 08:50

## 2022-03-16 RX ADMIN — SERTRALINE 50 MG: 50 TABLET, FILM COATED ORAL at 08:50

## 2022-03-16 RX ADMIN — APIXABAN 5 MG: 5 TABLET, FILM COATED ORAL at 08:50

## 2022-03-16 RX ADMIN — AMLODIPINE BESYLATE 10 MG: 5 TABLET ORAL at 08:50

## 2022-03-16 NOTE — PROGRESS NOTES
This RN reviewed pt's discharge paperwork, all questions answered. PIV's taken out. Tele taken off. PCA taking pt down to lobby.

## 2022-03-16 NOTE — DISCHARGE SUMMARY
[facility] -- Physician Discharge Summary     Chico Lizarraga  1940  MRN: [id number]    Admit Date: 3/13/2022  Discharge Date: No discharge date for patient encounter. Attending MD: Lavern Dodge MD  Discharging MD: Lavern Dodge MD  PCP: Justin Vela [address] [facility] / [facility] [phone]    Admission Diagnosis: Syncope and collapse [R55]  MATTSON (dyspnea on exertion) [R06.00]  Near syncope [R55]  DISCHARGE DIAGNOSIS: same    Full Hospital Problem List:  Active Hospital Problems    Diagnosis Date Noted    DVT of leg (deep venous thrombosis) ([facility]) [I82.409] 03/15/2022    Atrial fibrillation ([address] [I48.91] 03/14/2022    Lower extremity edema [R60.0] 03/14/2022    Syncope and collapse [R55] 03/13/2022    HTN (hypertension) [I10] 03/13/2022    High cholesterol [E78.00] 03/13/2022    Depression [F32. A] 03/13/2022           Hospital Course:   78 y. o. male who presents to the emergency department complaining of dizziness on and off for several months. [facility] specifies it is not the room that is spinning, rather that he feels dizzy/lightheaded when he stands up quickly from a seated position. [facility] also reports that even just short distances to the mailbox makes him feel very short of breath.  He reports a mild occipital headache with radiation into the neck.  This is been going on intermittently for months as well.  He denies any preceding injury.  Denies visual disturbances, back pain, neck stiffness, chest pain, cough, fever, chills, palpitations, hemoptysis, abdominal pain.      Daughter is an NP in another state  She has recommended cards eval and GXT  Given his exercise intolerance, GXT is not a bad idea     To be admitted for further eval, ECHO and GXT ordered       ECHO shows   Summary   Global left ventricular function is normal with ejection fraction estimated   from 50 % to 55 %. Normal left ventricular wall thickness.    Mild mitral & tricuspid regurgitation is present. PASP 23 mmHg. Stress test then done       Spect imaging demonstrates a small area of mildly decreased perfusion in the    apical inferior segment. The defect is fixed at rest and stress, consistent    with prior infarct.         Given this finding, interventional cards consulted  Cardiac cath done per dr Duane Jackson  Findings:  Artery Findings/Result   LM 10% distal taper   LAD 30% prox involving D1, D1 40% ostial, mid bridging. Cx Normal   RI N/A   RCA 20% mid   LVEDP 11   LVG 55%      Intervention:         None     Post Cath Dx:       Nonobstructive CAD      Pt doing ok post procedure  In sinus  To have outpt holter placed      While here, pt also noted to have R calf swelling  Dopplers ordered   Summary        Acute partially occluding deep vein thrombosis involving the right popliteal    vein. Proximal tip is poorly attached.    Acute totally occluding deep vein thrombosis involving one peroneal vein    proximal to zone 7.5 and multiple soleal veins right calf.    Acute partially and totally occluding deep vein thrombosis involving the    right PTV's throughout.    Acute totally occluding superficial venous thrombosis involving the right    lesser saphenous vein zone.    Acute partially and totally occluding deep vein thrombosis involving the    left PTV's zone. Pt has been started on anticoagulation for both DVT and AFib  To go home on eliquis 5 bid      Consults made during Hospitalization:  IP CONSULT TO INTERNAL MEDICINE  IP CONSULT TO CARDIOLOGY    Treatment team at time of Discharge: Treatment Team: Attending Provider: Benedict Felipe MD; Consulting Physician: Benedict Felipe MD; Consulting Physician: Ryan Ballard MD; : Akosua Preston RN; Utilization Reviewer: Marge Arnold RN; Respiratory Therapist (Day): Nikko Hutchinson RCP;  Registered Nurse: Aniyah Reyes RN    Imaging Results:  Echo Complete    Result Date: 3/14/2022  Transthoracic Echocardiography Report (TTE) Demographics   Patient Name       RENEE Cassidy   Date of Study      03/14/2022         Gender              Male   Patient Number     [id number]         Date of Birth       1940   Visit Number       [id number]          Age                 78 year(s)   Accession Number   [id number]         Room Number         8699   Corporate ID       [id number]            Sonographer         Nayla Hodge YEMI   Ordering Physician Deirdre Cooks,  Interpreting        Mike Cm DO, MD                 Physician  Procedure Type of Study   TTE procedure:ECHOCARDIOGRAM COMPLETE 2D W DOPPLER W COLOR. Procedure Date Date: 03/14/2022 Start: 10:30 AM Study Location: [facility] - Echo Lab Technical Quality: Adequate visualization Indications:Syncope. Patient Status: Routine Height: 70 inches Weight: 194 pounds BSA: 2.06 m2 BMI: 27.84 kg/m2 HR: 60 bpm BP: 137/78 mmHg  Conclusions   Summary  Global left ventricular function is normal with ejection fraction estimated  from 50 % to 55 %. Normal left ventricular wall thickness. Mild mitral & tricuspid regurgitation is present. PASP 23 mmHg. Signature   ------------------------------------------------------------------  Electronically signed by Mike Cm DO (Interpreting  physician) on 03/14/2022 at 05:49 PM  ------------------------------------------------------------------   Findings   Left Ventricle  Global left ventricular function is normal with ejection fraction estimated  from 50 % to 55 %. Normal left ventricular wall thickness. Left ventricle size is normal.  There is reversal of E/A inflow velocities across the mitral valve. Mitral Valve  Mitral valve is structurally normal.  Mild mitral regurgitation is present. Left Atrium  The left atrium is normal in size.    Aortic Valve  The aortic valve is structurally normal. There is no significant aortic  valve regurgitation or stenosis. Aorta  The aortic root is normal in size. Right Ventricle  The right ventricular size is normal.   Tricuspid Valve  Tricuspid valve is structurally normal.  Mild tricuspid regurgitation. Right Atrium  The right atrium is normal in size. Pulmonic Valve  The pulmonic valve is not well visualized. There is no evidence of pulmonic valve regurgitation or stenosis. Pericardial Effusion  No pericardial effusion noted. Pleural Effusion  No pleural effusion noted. Miscellaneous  IVC is normal in size (< 2.1 cm) and collapses > 50% with respiration  consistent with normal RA pressure (3 mmHg).   M-Mode/2D Measurements (cm)   LV Diastolic Dimension: 5.3 cm LV Systolic Dimension: 8.47 cm  LV Septum Diastolic: 7.39 cm  LV PW Diastolic: 3.84 cm       AO Root Dimension: 3.4 cm                                  LA Area: 18.1 cm2  LVOT: 2.1 cm                   LA volume/Index: 45.8 ml /22 ml/m2  Doppler Measurements   AV Peak Velocity: 131 cm/s     MV Peak E-Wave: 50.2 cm/s  AV Peak Gradient: 6.86 mmHg    MV Peak A-Wave: 102 cm/s  AV Mean Gradient: 4 mmHg       MV E/A Ratio: 0.49  LVOT Peak Velocity: 105 cm/s  AV Area (Continuity):3.33 cm2   TR Velocity:222 cm/s  TR Gradient:19.71 mmHg  Estimated RAP:3 mmHg  Estimated RVSP: 23 mmHg  E' Septal Velocity: 6.85 cm/s  E' Lateral Velocity: 9.68 cm/s  E/E' ratio: 6.3   Aortic Valve   Peak Velocity: 131 cm/s     Mean Velocity: 89 cm/s  Peak Gradient: 6.86 mmHg    Mean Gradient: 4 mmHg  Area (continuity): 3.33 cm2  AV VTI: 28.8 cm  Aorta   Aortic Root: 3.4 cm     Aortic Arch: 2.9 cm  Ascending Aorta: 3.6 cm  LVOT Diameter: 2.1 cm      CT HEAD WO CONTRAST    Result Date: 3/13/2022  EXAMINATION: CT OF THE HEAD WITHOUT CONTRAST  3/13/2022 4:24 pm TECHNIQUE: CT of the head was performed without the administration of intravenous contrast. Dose modulation, iterative reconstruction, and/or weight based adjustment of the mA/kV was utilized to reduce the radiation dose to as low as reasonably achievable. COMPARISON: None. HISTORY: ORDERING SYSTEM PROVIDED HISTORY: dizziness TECHNOLOGIST PROVIDED HISTORY: Reason for exam:->dizziness Has a \"code stroke\" or \"stroke alert\" been called? ->No Decision Support Exception - unselect if not a suspected or confirmed emergency medical condition->Emergency Medical Condition (MA) Reason for Exam: dizziness Relevant Medical/Surgical History: Dizziness (c/o dizzyness on and off for months) FINDINGS: BRAIN/VENTRICLES: There is no acute intracranial hemorrhage, mass effect or midline shift. No abnormal extra-axial fluid collection. The gray-white differentiation is maintained without evidence of an acute infarct. There is no evidence of hydrocephalus. There are chronic atrophic changes consistent with the patient's age. ORBITS: The visualized portion of the orbits demonstrate no acute abnormality. SINUSES: The visualized paranasal sinuses and mastoid air cells demonstrate no acute abnormality. SOFT TISSUES/SKULL:  No acute abnormality of the visualized skull or soft tissues. No acute intracranial abnormality. XR CHEST PORTABLE    Result Date: 3/13/2022  EXAMINATION: ONE XRAY VIEW OF THE CHEST 3/13/2022 2:43 pm COMPARISON: None. HISTORY: ORDERING SYSTEM PROVIDED HISTORY: dizzy TECHNOLOGIST PROVIDED HISTORY: Reason for exam:->dizzy Reason for Exam: Dizziness (c/o dizzyness on and off for months) FINDINGS: No tracheal or mediastinal shift was noted. No cardiomegaly, pneumonia, interstitial edema or pleural effusions were identified. No hilar mass was noted. The regional skeleton was unremarkable. No cardiomegaly, pneumonia or interstitial edema. CT CHEST PULMONARY EMBOLISM W CONTRAST    Result Date: 3/14/2022  EXAMINATION: CTA OF THE CHEST 3/14/2022 2:54 pm TECHNIQUE: CTA of the chest was performed after the administration of intravenous contrast.  Multiplanar reformatted images are provided for review.   MIP images are provided for review. Dose modulation, iterative reconstruction, and/or weight based adjustment of the mA/kV was utilized to reduce the radiation dose to as low as reasonably achievable. COMPARISON: 2012. HISTORY: ORDERING SYSTEM PROVIDED HISTORY: R/O PE, Vasc Study positive for bilat DVT TECHNOLOGIST PROVIDED HISTORY: Reason for exam:->R/O PE, Vasc Study positive for bilat DVT Reason for Exam: R/O PE, Vasc Study positive for bilat DVT FINDINGS: Pulmonary Arteries: Pulmonary arteries are adequately opacified for evaluation. No evidence of intraluminal filling defect to suggest pulmonary embolism. Main pulmonary artery is normal in caliber. Mediastinum: No evidence of mediastinal lymphadenopathy. The heart and pericardium demonstrate no acute abnormality. There is no acute abnormality of the thoracic aorta. Lungs/pleura: The lungs are without acute process. No focal consolidation or pulmonary edema. No evidence of pleural effusion or pneumothorax. 2-3 mm nodule right upper lobe just above the minor fissure is seen series 9, image 144. This is unchanged from prior study 2012, faintly demonstrated prior CT 3 image 42. Upper Abdomen: Limited images of the upper abdomen demonstrate no acute abnormality. A simple cyst involves the pancreatic tail measuring 1.4 cm. This is likely incidental.  Renal cysts also incidentally noted greater on the left. The gallbladder is been removed. Soft Tissues/Bones: No acute bone or soft tissue abnormality. No evidence of pulmonary embolism or acute pulmonary abnormality. 2-3 mm right upper lobe nodule likely incidental.  No further follow-up is warranted.      VL Extremity Venous Bilateral    Result Date: 3/14/2022  Lower Extremities DVT Study  Demographics   Patient Name       ANTE Papito Darnell   Date of Study      03/14/2022         Gender              Male   Patient Number     [id number]         Date of Birth       1940   Visit Number       [id number]          Age 78 year(s)   Accession Number   [id number]         Room Number         5971   Corporate ID       [id number]            [address], Toshia Aguila,                                                            [address]   Ordering Physician Jim Rodriguez,  Interpreting        I Vascular                     MD                 Physician           Richard Braga MD,                                                            [facility] [place], [address]  Procedure Type of Study:   Veins:Lower Extremities DVT Study, VASC EXTREMITY VENOUS DUPLEX BILATERAL. Vascular Sonographer Report  Additional Indications:Swelling Impressions Right Impression Acute partially occluding deep vein thrombosis involving the right popliteal vein. Proximal tip is poorly attached. Acute totally occluding deep vein thrombosis involving one peroneal vein proximal to zone 7.5 and multiple soleal veins zone 6-7 right calf. Acute partially and totally occluding deep vein thrombosis involving the right PTV's throughout. Acute totally occluding superficial venous thrombosis involving the right lesser saphenous vein zone 5-6. No other evidence of deep vein or superficial vein thrombosis involving the right lower extremity. Left Impression Acute partially and totally occluding deep vein thrombosis involving the left PTV's zone 6-7. No other evidence of deep vein or superficial vein thrombosis involving the left lower extremity. Verbal to Yulissa FULLER. Conclusions   Summary   Acute partially occluding deep vein thrombosis involving the right popliteal  vein. Proximal tip is poorly attached. Acute totally occluding deep vein thrombosis involving one peroneal vein  proximal to zone 7.5 and multiple soleal veins right calf. Acute partially and totally occluding deep vein thrombosis involving the  right PTV's throughout. Acute totally occluding superficial venous thrombosis involving the right  lesser saphenous vein zone.   Acute partially and totally occluding deep vein thrombosis involving the  left PTV's zone. Signature   ------------------------------------------------------------------  Electronically signed by Richard Braga MD, [facility] [place], [address]  (Interpreting physician) on 03/14/2022 at 01:39 PM  ------------------------------------------------------------------  Patient Status:Routine. [address] [place] [address] Vascular Lab. Technical Quality:Adequate visualization. Velocities are measured in cm/s ; Diameters are measured in mm Right Lower Extremities DVT Study Measurements Right 2D Measurements +------------------------+----------+---------------+----------+ ! Location                ! Visualized! Compressibility! Thrombosis! +------------------------+----------+---------------+----------+ ! Sapheno Femoral Junction! Yes       ! Yes            ! None      ! +------------------------+----------+---------------+----------+ ! GSV Thigh               ! Yes       ! Yes            ! None      ! +------------------------+----------+---------------+----------+ ! Common Femoral          !Yes       ! Yes            ! None      ! +------------------------+----------+---------------+----------+ ! Prox Femoral            !Yes       ! Yes            ! None      ! +------------------------+----------+---------------+----------+ ! Mid Femoral             !Yes       ! Yes            ! None      ! +------------------------+----------+---------------+----------+ ! Dist Femoral            !Yes       ! Yes            ! None      ! +------------------------+----------+---------------+----------+ ! Deep Femoral            !Yes       ! Yes            ! None      ! +------------------------+----------+---------------+----------+ ! Popliteal               !Yes       ! Partial        !Acute     ! +------------------------+----------+---------------+----------+ ! GSV Below Knee          ! Yes       ! Yes            ! None      ! +------------------------+----------+---------------+----------+ ! Gastroc !Yes       !Yes            ! None      ! +------------------------+----------+---------------+----------+ ! Soleal                  !Yes       ! No             !Acute     ! +------------------------+----------+---------------+----------+ ! PTV                     ! Yes       ! No             !Acute     ! +------------------------+----------+---------------+----------+ ! Peroneal                !Yes       ! No             !Acute     ! +------------------------+----------+---------------+----------+ ! SSV                     ! Yes       ! No             !Acute     ! +------------------------+----------+---------------+----------+ Right Doppler Measurements +------------------------+------+------+------------+ ! Location                ! Signal!Reflux! Reflux (sec)! +------------------------+------+------+------------+ ! Sapheno Femoral Junction! Phasic!      !            ! +------------------------+------+------+------------+ ! Common Femoral          !Phasic!      !            ! +------------------------+------+------+------------+ ! Prox Femoral            !Phasic!      !            ! +------------------------+------+------+------------+ ! Deep Femoral            !Phasic!      !            ! +------------------------+------+------+------------+ ! Popliteal               !Phasic!      !            ! +------------------------+------+------+------------+ Left Lower Extremities DVT Study Measurements Left 2D Measurements +------------------------+----------+---------------+----------+ ! Location                ! Visualized! Compressibility! Thrombosis! +------------------------+----------+---------------+----------+ ! Sapheno Femoral Junction! Yes       ! Yes            ! None      ! +------------------------+----------+---------------+----------+ ! GSV Thigh               ! Yes       ! Yes            ! None      ! +------------------------+----------+---------------+----------+ ! Common Femoral          !Yes       ! Yes            ! None      ! +------------------------+----------+---------------+----------+ ! Prox Femoral            !Yes       ! Yes            ! None      ! +------------------------+----------+---------------+----------+ ! Mid Femoral             !Yes       ! Yes            ! None      ! +------------------------+----------+---------------+----------+ ! Dist Femoral            !Yes       ! Yes            ! None      ! +------------------------+----------+---------------+----------+ ! Deep Femoral            !Yes       ! Yes            ! None      ! +------------------------+----------+---------------+----------+ ! Popliteal               !Yes       ! Yes            ! None      ! +------------------------+----------+---------------+----------+ ! GSV Below Knee          ! Yes       ! Yes            ! None      ! +------------------------+----------+---------------+----------+ ! Gastroc                 ! Yes       ! Yes            ! None      ! +------------------------+----------+---------------+----------+ ! PTV                     ! Yes       ! No             !Acute     ! +------------------------+----------+---------------+----------+ ! Peroneal                !Yes       ! Yes            ! None      ! +------------------------+----------+---------------+----------+ ! SSV                     ! Yes       ! Yes            ! None      ! +------------------------+----------+---------------+----------+ Left Doppler Measurements +------------------------+------+------+------------+ ! Location                ! Signal!Reflux! Reflux (sec)! +------------------------+------+------+------------+ ! Sapheno Femoral Junction! Phasic!      !            ! +------------------------+------+------+------------+ ! Common Femoral          !Phasic!      !            ! +------------------------+------+------+------------+ ! Prox Femoral            !Phasic!      !            ! +------------------------+------+------+------------+ ! Deep Femoral            !Phasic!      !            ! +------------------------+------+------+------------+ ! Popliteal               !Phasic!      !            ! +------------------------+------+------+------------+    NM Cardiac Stress Test Nuclear Imaging    Result Date: 3/15/2022  Cardiac Perfusion Imaging  Demographics   Patient Name      RENEE Case   Date of Study     03/15/2022         Gender              Male   Patient Number    [id number]         Date of Birth       1940   Visit Number      [id number]          Age                 78 year(s)   Accession Number  [id number]         Room Number         5624   Corporate ID      [id number]            NM Technician       Saadia Roberson   Nurse             Cy Closs, RN   Interpreting        Cordie Poster, DO                                       Physician   Ordering          Donna Caro,  Physician         MD   The procedure was explained in detail to the patient. Risks,  complications and alternative treatments were reviewed. Written consent  was obtained. Procedure Procedure Type:   Nuclear Stress Test:NM MYOCARDIAL SPECT REST EXERCISE OR RX,  Pharmacological   Study location: [facility] - Nuclear Medicine   Indications: Syncope. Hospital Status: Inpatient. Height: 70 inches Weight: 194 pounds  Risk Factors   The patient risk factors include:treated and controlled  hypercholesterolemia, treated and controlled hypertension, family history of  premature CAD and dyslipidemia. Conclusions   Summary  The overall quality of the study is good. There is subdiaphragmatic  attenuation. Left ventricular cavity size is mildly dilated. The right ventricle is  normal in size. Spect imaging demonstrates a small area of mildly decreased perfusion in the  apical inferior segment. The defect is fixed at rest and stress, consistent  with prior infarct. Sum stress score of 5. There is visual and calculated TID (transient ischemic dilation) of 1.39  with lexiscan.    Left ventricular ejection fraction is normal at 57%. Myocardial perfusion is abnormal, consistent with prior infarct. High risk  scan given TID, which may indicate multivessel CAD or proximal LAD disease. Recommendation  Discussed with Dr. Gaona Adjutant Protocols   Resting ECG  SInus rhythm. Anteroseptal MI. Resting HR:79 bpm  Resting BP:151/79 mmHg  Stress Protocol:Pharmacologic - Lexiscan's  Peak HR:100 bpm                              HR/BP product:9600  Peak BP:96/61 mmHg  Predicted HR: 137 bpm  % of predicted HR: 73  Test duration: 4 min  Reason for termination:Completed   ECG Findings  Sinus tachycardia. Arrhythmias  No significant rhythm abnormality. Symptoms  Patient developed lightheadedness and short of breath, likely related to  [address] [place] [address]. Symptoms resolved with rest.  Denies chest pain or discomfort. Complications  Procedure complication was none. Stress Interpretation  Non-diagnostic EKG response due to failure to reach target heart rate. Less than 0.5 mm upsloping ST deviation. Appropriate hemodynamic response to lexiscan with no significant ST  changes. Procedure Medications   - Lexiscan I.V. 0.4 mg.10 sec  Imaging Protocols   - One Day   Rest                          Stress   Isotope:Myoview/Tetrofosmin   Isotope: Myoview/Tetrofosmin  Isotope dose:11.19 mCi        Isotope dose:32.8 mCi  Administration Route:I.V.      Administration Route:I.V.  Date:03/15/2022 12:06         Date:03/15/2022 10:12                                 Technique:      Gated  Imaging Results    Stress ejection    Ejection fraction:57 %    EDV :127 ml    ESV :55 ml    Stroke volume :72 ml    LV mass :162 gr  Medical History   Additional Medical History   Past Medical History:  Diagnosis Date  -Anxiety  -Hyperlipidemia  -Hypertension   Signatures   ------------------------------------------------------------------  Electronically signed by Billie Arciniega DO (Interpreting  physician) on 03/15/2022 at 13:08 ------------------------------------------------------------------          Discharge Exam: (2-7 system for EPF/Detailed, ?8 for Comprehensive)  BP (!) 149/73   Pulse 67   Temp 98.1 °F (36.7 °C) (Oral)   Resp 16   Ht 5' 10\" (1.778 m)   Wt 197 lb 9.6 oz (89.6 kg)   SpO2 96%   BMI 28.35 kg/m²   Constitutional: vitals as above: alert, appears stated age and cooperative    Psychiatric: normal insight and judgment, oriented to person, place, time, and general circumstances    Head: Normocephalic, without obvious abnormality, atraumatic    Eyes:lids and lashes normal, conjunctivae and sclerae normal and pupils equal, round, reactive to light and accomodation    EMNT: external ears normal, nares midline    Neck: no carotid bruit, supple, symmetrical, trachea midline and thyroid not enlarged, symmetric, no tenderness/mass/nodules     Respiratory: clear to auscultation and percussion bilaterally with normal respiratory effort    Cardiovascular: normal rate, regular rhythm, normal S1 and S2 and no murmurs    Gastrointestinal: soft, non-tender, non-distended, normal bowel sounds, no masses or organomegaly    Extremities: no clubbing, 1+ R LE edema    Skin:No rashes or nodules noted. Neurologic:negative             Disposition: home    Condition: stable    Discharge Medications:  [unfilled]       Medication List      START taking these medications     apixaban 5 MG Tabs tablet; Commonly known as: ELIQUIS; Take 1 tablet by   mouth 2 times daily;  Notes to patient: Eliquis (apixaban) keeps the   platelets in your blood from coagulating (clotting)  Side effect: bruising        CONTINUE taking these medications     amLODIPine 10 MG tablet; Commonly known as: Milena Ybarra; Notes to patient:   Use: to treat high blood pressure and chest pain Side effects: headache,   swelling, dizziness   atorvastatin 10 MG tablet; Commonly known as: LIPITOR; Notes to patient:   Use: lower bad cholesterol Side effects: headache, muscle pain,   constipation, diarrhea   Claritin 10 MG tablet; Generic drug: loratadine; Notes to patient: Use:   antihistamine used to treat allergies Side effects: Drowsiness, dry mouth,   headache and indigestion   sertraline 50 MG tablet; Commonly known as: ZOLOFT; Notes to patient:   Antidepressant  May cause changes to mood, appetite, sleep    therapeutic multivitamin-minerals tablet; Notes to patient:   Dietary/vitamin supplement Side effects: discolored urine    Tylenol PM Extra Strength  MG tablet; Generic drug:   diphenhydrAMINE-APAP (sleep); Notes to patient: Use: to treat pain Side   effects: drowsiness, nausea dry mouth, muscle weakness          Allergies:  No Known Allergies    Follow up Instructions: Follow-up with PCP: Simon Xiao in  2 wk . Total time spent on day of discharge including face-to-face visit, examination, documentation, counseling, preparation of discharge plans and followup, and discharge medicine reconciliation and presciptions is 37 minutes      ---       Subjective from day of d/c:   Gaetano Kiran is a 78 y.o. male. Pt seen and examined  Chart reviewed since last visit, labs and imaging below      Doing well  No chest pain  Remains in sinus    He denies chest pain, denies shortness of breath, denies nausea,  denies emesis. 10 system Review of Systems is reviewed with patient, and pertinent positives are noted in HPI above . Otherwise, Review of systems is negative. I have reviewed the patient's medical and social history in detail and updated the computerized patient record. To recap: He  has a past medical history of Anxiety, Hyperlipidemia, and Hypertension. . He  has no past surgical history on file. Ron Edwards He  reports that he has quit smoking.  He has never used smokeless tobacco..      Current Facility-Administered Medications: regadenoson (LEXISCAN) injection 0.4 mg, 0.4 mg, IntraVENous, ONCE PRN  apixaban (ELIQUIS) tablet 5 mg, 5 mg, Oral, BID  cetirizine (ZYRTEC) tablet 10 mg, 10 mg, Oral, Nightly  diphenhydrAMINE (BENADRYL) tablet 25 mg, 25 mg, Oral, Nightly PRN  sertraline (ZOLOFT) tablet 50 mg, 50 mg, Oral, Daily  amLODIPine (NORVASC) tablet 10 mg, 10 mg, Oral, Daily  atorvastatin (LIPITOR) tablet 10 mg, 10 mg, Oral, Daily  perflutren lipid microspheres (DEFINITY) injection 1.65 mg, 1.5 mL, IntraVENous, ONCE PRN  sodium chloride flush 0.9 % injection 5-40 mL, 5-40 mL, IntraVENous, 2 times per day  sodium chloride flush 0.9 % injection 10 mL, 10 mL, IntraVENous, PRN  0.9 % sodium chloride infusion, 25 mL, IntraVENous, PRN  potassium chloride (KLOR-CON M) extended release tablet 40 mEq, 40 mEq, Oral, PRN **OR** potassium bicarb-citric acid (EFFER-K) effervescent tablet 40 mEq, 40 mEq, Oral, PRN **OR** potassium chloride 10 mEq/100 mL IVPB (Peripheral Line), 10 mEq, IntraVENous, PRN  ondansetron (ZOFRAN-ODT) disintegrating tablet 4 mg, 4 mg, Oral, Q8H PRN **OR** ondansetron (ZOFRAN) injection 4 mg, 4 mg, IntraVENous, Q6H PRN  magnesium hydroxide (MILK OF MAGNESIA) 400 MG/5ML suspension 30 mL, 30 mL, Oral, Daily PRN  acetaminophen (TYLENOL) tablet 650 mg, 650 mg, Oral, Q6H PRN **OR** acetaminophen (TYLENOL) suppository 650 mg, 650 mg, Rectal, Q6H PRN  hydrALAZINE (APRESOLINE) injection 10 mg, 10 mg, IntraVENous, Q6H PRN  0.9 % sodium chloride bolus, 500 mL, IntraVENous, PRN         Objective (exam): see above    Labs at time of d/c:  Lab Results   Component Value Date    WBC 5.1 03/16/2022    HGB 12.5 (L) 03/16/2022    HCT 36.6 (L) 03/16/2022     03/16/2022    ALT 14 03/14/2022    AST 17 03/14/2022     03/16/2022    K 4.8 03/16/2022     03/16/2022    CREATININE 0.9 03/16/2022    BUN 20 03/16/2022    CO2 26 03/16/2022    LABMICR YES 03/13/2022     Lab Results   Component Value Date    TROPONINI <0.01 03/14/2022       (Please note that portions of this note were completed with a voice recognition program.  Efforts were made to edit the dictations but occasionally words are mis-transcribed.)      Signed:  Pilar Joseph MD  3/16/2022

## 2022-03-16 NOTE — PROGRESS NOTES
[facility]   Electrophysiology Progress Note     Admit Date: 3/13/2022     Reason for follow up: Atrial fibrillation    HPI and Interval History: 78 y.o. male presented with presented to the hospital with intermittent dizziness and lightheadedness. Had atrial fibrillation on telemetry, appeared asymptomatic. Also found to have acute DVT of the right LE. Patient seen and examined. Clinical notes reviewed. Telemetry reviewed. No new complaint today. No major events overnight. Denies having chest pain, shortness of breath, dyspnea on exertion, Orthopnea, PND at the time of this visit. Converted to sinus rhythm. Had cardiac cath which showed mild non-obstructive CAD. Denies having any chest pain or shortness of breath. Feeling better. Assessment:   -Paroxysmal atrial fibrillation              New diagnosis  - Acute DVT    New diagnosis  -Lightheadedness and dizziness  -Shortness of breath  -Hypertension  -HLD    Plan:   -Paroxysmal atrial fibrillation noted on telemetry. He was asymptomatic with it although he reports occasional fatigue that can be related to his atrial fibrillation.   -I discussed signs and symptoms of atrial fibrillation in addition to treatment options with him and his wife in detail.  -Plan for outpatient Holter monitoring to assess the atrial fibrillation burden. If he has symptomatic atrial fibrillation and high atrial fibrillation burden ablation can be considered.  -He denies syncope/faint.  - [id number] Score: 3  - TSH normal 2.97 .   -Heart rates were controlled during atrial fibrillation    -Association of symptoms with arrhythmia is essential. He reported no symptoms during his atrial fibrillation while hospitalized. -Now diagnosed with acute DVT and is on IV heparin.  -Consider DOAC's at discharge  -Echocardiography with normal ejection fraction. -Myoview stress test    - ?  Etiology for dizziness and lightheadedness, other causes needs to be assessed/addressed by primary team   - Orthostatic BP     -Has been diagnosed with acute DVT  - ? Etiology. Patient reports that he was active and exercised every other day. No reports of long travel. ? Hypercoagulable state, will defer to primary team.    Cardiac catheterization with mild nonobstructive coronary artery disease. I discussed his treatment options, diagnostic evaluation, atrial fibrillation, symptoms signs with him and his wife in detail. He can be discharged home with follow-up in EP office. Discussed with nursing staff. Active Hospital Problems    Diagnosis Date Noted    D[facility] of leg (deep venous thrombosis) ([facility]) [I82.409] 03/15/2022    Atrial fibrillation ([address] [I48.91] 03/14/2022    Lower extremity edema [R60.0] 03/14/2022    Syncope and collapse [R55] 03/13/2022    HTN (hypertension) [I10] 03/13/2022    High cholesterol [E78.00] 03/13/2022    Depression [F32. A] 03/13/2022       Diagnostic studies:     ECG: Atrial fibrillation     Myoview:       Left ventricular cavity size is mildly dilated. The right ventricle is    normal in size.        Spect imaging demonstrates a small area of mildly decreased perfusion in the    apical inferior segment. The defect is fixed at rest and stress, consistent    with prior infarct.        Sum stress score of 5.    There is visual and calculated TID (transient ischemic dilation) of 1.39    with lexiscan.        Left ventricular ejection fraction is normal at 57%.        Myocardial perfusion is abnormal, consistent with prior infarct. High risk    scan given TID, which may indicate multivessel CAD or proximal LAD disease.           Echo:   Global left ventricular function is normal with ejection fraction estimated   from 50 % to 55 %. Normal left ventricular wall thickness. Mild mitral & tricuspid regurgitation is present. PASP 23 mmHg.        D-Dimer: 1417   CTPA: No PE    LE Doppler:   Acute partially occluding deep vein thrombosis involving the right popliteal    vein. Proximal tip is poorly attached.    Acute totally occluding deep vein thrombosis involving one peroneal vein    proximal to zone 7.5 and multiple soleal veins right calf.    Acute partially and totally occluding deep vein thrombosis involving the    right PTV's throughout.    Acute totally occluding superficial venous thrombosis involving the right    lesser saphenous vein zone.    Acute partially and totally occluding deep vein thrombosis involving the    left PTV's zone. I independently reviewed the cardiac diagnostic studies, ECG and relevant imaging studies. Physical Examination:  Vitals:    22 1023   BP:    Pulse: 63   Resp:    Temp:    SpO2:       No intake/output data recorded. Wt Readings from Last 3 Encounters:   22 197 lb 9.6 oz (89.6 kg)     Temp  Av.8 °F (36.6 °C)  Min: 97.6 °F (36.4 °C)  Max: 98.1 °F (36.7 °C)  Pulse  Av.2  Min: 60  Max: 78  BP  Min: 117/74  Max: 171/79  SpO2  Av.3 %  Min: 91 %  Max: 98 %  No intake or output data in the 24 hours ending 22 1034    I independently reviewed all cardiac tracing from cardiac telemetry. · Constitutional: Oriented. No distress. · Head: Normocephalic and atraumatic. · Mouth/Throat: Oropharynx is clear and moist.   · Eyes: Conjunctivae normal. EOM are normal.   · Neck: Neck supple. No JVD present. · Cardiovascular: Normal rate, regular rhythm, S1&S2. · Pulmonary/Chest: Bilateral respiratory sounds. No rhonchi. · Abdominal: Soft. No tenderness. · Musculoskeletal: No tenderness. Trace  edema    · Lymphadenopathy: Has no cervical adenopathy. · Neurological: Alert and oriented. Follows command, No Gross deficit   · Skin: Skin is warm, No rash noted.    · Psychiatric: Has a normal behavior     Scheduled Meds:   apixaban  5 mg Oral BID    cetirizine  10 mg Oral Nightly    sertraline  50 mg Oral Daily    amLODIPine  10 mg Oral Daily    atorvastatin  10 mg Oral Daily    sodium chloride flush  5-40 mL IntraVENous 2 times per day     Continuous Infusions:   sodium chloride       PRN Meds:regadenoson, diphenhydrAMINE, perflutren lipid microspheres, sodium chloride flush, sodium chloride, potassium chloride **OR** potassium alternative oral replacement **OR** potassium chloride, ondansetron **OR** ondansetron, magnesium hydroxide, acetaminophen **OR** acetaminophen, hydrALAZINE, sodium chloride     Prior to Admission medications    Medication Sig Start Date End Date Taking? Authorizing Provider   apixaban (ELIQUIS) 5 MG TABS tablet Take 1 tablet by mouth 2 times daily 3/16/22  Yes Toy Valenzuela MD   sertraline (ZOLOFT) 50 MG tablet Take 50 mg by mouth daily   Yes Historical Provider, MD   amLODIPine (NORVASC) 10 MG tablet Take 10 mg by mouth daily   Yes Historical Provider, MD   atorvastatin (LIPITOR) 10 MG tablet Take 5 mg by mouth daily    Yes Historical Provider, MD   diphenhydrAMINE-APAP, sleep, (TYLENOL PM EXTRA STRENGTH)  MG tablet Take 1 tablet by mouth nightly as needed for Sleep   Yes Historical Provider, MD   Multiple Vitamins-Minerals (THERAPEUTIC MULTIVITAMIN-MINERALS) tablet Take 1 tablet by mouth daily   Yes Historical Provider, MD   loratadine (CLARITIN) 10 MG tablet Take 10 mg by mouth at bedtime   Yes Historical Provider, MD       Review of System:  [x] Full ROS obtained and negative except as mentioned in HPI    Relevant and available labs, and cardiovascular diagnostics reviewed. Reviewed. Recent Labs     03/13/22  1559 03/14/22  0613 03/16/22  0529    142 139   K 4.9 4.3 4.8    107 105   CO2 24 23 26   BUN 26* 29* 20   CREATININE 1.3 0.9 0.9     Recent Labs     03/14/22  0613 03/14/22  1422 03/16/22  0529   WBC 5.5 6.0 5.1   HGB 12.9* 14.0 12.5*   HCT 38.5* 41.7 36.6*   MCV 91.7 91.4 92.0    172 158     Estimated Creatinine Clearance: 70 mL/min (based on SCr of 0.9 mg/dL).    No results found for: BNP    I independently reviewed all cardiac tracing from cardiac telemetry. I independently reviewed relevant and available cardiac diagnostic tests ECG, CXR, Echo, Stress test, Device interrogation, Holter, CT scan. Outside medical records via Care everywhere reviewed and summarized in H&P above. I have spent 35 minutes of face to face time with the patient with more than 50% spent counseling and coordinating care for this patient    Thank you for allowing me to participate in the care of Lillie Giang     All questions and concerns were addressed to the patient/family. Alternatives to my treatment were discussed. I have discussed the above stated plan and the patient verbalized understanding and agreed with the plan. NOTE: This report was transcribed using voice recognition software. Every effort was made to ensure accuracy, however, inadvertent computerized transcription errors may be present.      Beatriz Espitia MD, MPH  [facility]   Office: [phone]  Fax: [phone]

## 2022-03-16 NOTE — PROGRESS NOTES
CLINICAL PHARMACY NOTE: MEDS TO BEDS    Total # of Prescriptions Filled: 1   The following medications were delivered to the patient:  · Eliquis 5 mg    Additional Documentation:    Delivered to Patient wife=Signed  Katelyn Vallejo CPhT

## 2022-03-17 NOTE — PROGRESS NOTES
Physician Progress Note      Yoli Kumar  CSN #:                  [id number]  :                       1940  ADMIT DATE:       3/13/2022 3:26 PM  Almita Yap DATE:        3/16/2022 12:56 PM  RESPONDING  PROVIDER #:        Abbi Sahni MD          QUERY TEXT:    Patient admitted with Syncope and collapse noted to have Afib and CAD. If   possible, please document in progress notes and discharge summary if you are   evaluating and/or treating any of the following: The medical record reflects the following:  Risk Factors: CAD, AFib, abnormal stress test  Clinical Indicators: Patient admitted with syncope and collapse. Pt noted to   have abnormal stress test. LHC performed finding Nonobstructive CAD. Pt noted   to have Afib. 3/15 Cards note documented -Patient noted to have atrial   fibrillation on telemetry monitor yesterday. He has converted back to sinus   rhythm. He felt good while in atrial fibrillation so I doubt his symptoms   were related to atrial fibrillation. Treatment: stress test, LHC  Options provided:  -- Syncope likely due to CAD  -- Syncope likely due to  Afib  -- Other - I will add my own diagnosis  -- Disagree - Not applicable / Not valid  -- Disagree - Clinically unable to determine / Unknown  -- Refer to Clinical Documentation Reviewer    PROVIDER RESPONSE TEXT:    The syncope is likely due to CAD.     Query created by: Santi Motley on 3/16/2022 10:58 AM      Electronically signed by:  Abbi Sahni MD 3/17/2022 10:33 AM

## 2022-03-24 ENCOUNTER — TELEPHONE (OUTPATIENT)
Dept: CARDIOLOGY CLINIC | Age: 82
End: 2022-03-24

## 2022-03-24 NOTE — TELEPHONE ENCOUNTER
Pt's wife calling to get the order for the requested \"pulmonary test\", so that it can be scheduled. Stated Dr. Hussein Leons wanted it done.

## 2022-03-25 DIAGNOSIS — R06.02 SHORTNESS OF BREATH: Primary | ICD-10-CM

## 2022-04-01 ENCOUNTER — HOSPITAL ENCOUNTER (OUTPATIENT)
Dept: PULMONOLOGY | Age: 82
Discharge: HOME | End: 2022-04-01
Payer: MEDICARE

## 2022-04-01 VITALS — RESPIRATION RATE: 18 BRPM | HEART RATE: 65 BPM | OXYGEN SATURATION: 98 %

## 2022-04-01 DIAGNOSIS — R06.02 SHORTNESS OF BREATH: ICD-10-CM

## 2022-04-01 LAB
DLCO %PRED: 127 %
DLCO PRED: NORMAL
DLCO/VA %PRED: NORMAL
DLCO/VA PRED: NORMAL
DLCO/VA: NORMAL
DLCO: NORMAL
EXPIRATORY TIME-POST: NORMAL
EXPIRATORY TIME: NORMAL
FEF 25-75% %CHNG: NORMAL
FEF 25-75% %PRED-POST: NORMAL
FEF 25-75% %PRED-PRE: NORMAL
FEF 25-75% PRED: NORMAL
FEF 25-75%-POST: NORMAL
FEF 25-75%-PRE: NORMAL
FEV1 %PRED-POST: NORMAL %
FEV1 %PRED-PRE: 99 %
FEV1 PRED: NORMAL
FEV1-POST: NORMAL
FEV1-PRE: NORMAL
FEV1/FVC %PRED-POST: NORMAL
FEV1/FVC %PRED-PRE: NORMAL
FEV1/FVC PRED: NORMAL
FEV1/FVC-POST: NORMAL %
FEV1/FVC-PRE: 67 %
FVC %PRED-POST: NORMAL
FVC %PRED-PRE: NORMAL
FVC PRED: NORMAL
FVC-POST: NORMAL
FVC-PRE: NORMAL
GAW %PRED: NORMAL
GAW PRED: NORMAL
GAW: NORMAL
IC %PRED: NORMAL
IC PRED: NORMAL
IC: NORMAL
MEP: NORMAL
MIP: NORMAL
MVV %PRED-PRE: NORMAL
MVV PRED: NORMAL
MVV-PRE: NORMAL
PEF %PRED-POST: NORMAL
PEF %PRED-PRE: NORMAL
PEF PRED: NORMAL
PEF%CHNG: NORMAL
PEF-POST: NORMAL
PEF-PRE: NORMAL
RAW %PRED: NORMAL
RAW PRED: NORMAL
RAW: NORMAL
RV %PRED: NORMAL
RV PRED: NORMAL
RV: NORMAL
SVC %PRED: NORMAL
SVC PRED: NORMAL
SVC: NORMAL
TLC %PRED: 106 %
TLC PRED: NORMAL
TLC: NORMAL
VA %PRED: NORMAL
VA PRED: NORMAL
VA: NORMAL
VTG %PRED: NORMAL
VTG PRED: NORMAL
VTG: NORMAL

## 2022-04-01 PROCEDURE — 94200 LUNG FUNCTION TEST (MBC/MVV): CPT

## 2022-04-01 PROCEDURE — 94726 PLETHYSMOGRAPHY LUNG VOLUMES: CPT

## 2022-04-01 PROCEDURE — 94760 N-INVAS EAR/PLS OXIMETRY 1: CPT

## 2022-04-01 PROCEDURE — 94010 BREATHING CAPACITY TEST: CPT

## 2022-04-01 PROCEDURE — 94729 DIFFUSING CAPACITY: CPT

## 2022-04-01 RX ORDER — ALBUTEROL SULFATE 90 UG/1
4 AEROSOL, METERED RESPIRATORY (INHALATION) ONCE
Status: CANCELLED | OUTPATIENT
Start: 2022-04-01

## 2022-04-01 ASSESSMENT — PULMONARY FUNCTION TESTS
FEV1/FVC_PRE: 67
FEV1_PERCENT_PREDICTED_PRE: 99

## 2022-04-18 PROCEDURE — 93228 REMOTE 30 DAY ECG REV/REPORT: CPT | Performed by: INTERNAL MEDICINE

## 2022-05-11 ENCOUNTER — TELEPHONE (OUTPATIENT)
Dept: CARDIOLOGY CLINIC | Age: 82
End: 2022-05-11

## 2022-05-11 NOTE — TELEPHONE ENCOUNTER
Wife is asking for pt's monitor results. Wife states pt fatigue, sob and light dizziness on a daily basis. States pt needs to be seen sooner than June 27. Please call to advise.

## 2022-05-23 NOTE — PROGRESS NOTES
[address]   Electrophysiology Follow up   Date: 5/24/2022  I had the privilege of visiting Augustus Wheat in the office. CC: PAF  HPI: Augustus Wheat is a 78 y.o. male with a PMH of Afib, HTN, CAD, and DVT. 3/13/2022 he presented to [facility] ED with complaints of dizziness and lightheadedness. He also had shortness of breath. Underwent stress testing that was abnormal and a subsequent LHC which showed Mild nonobstructive CAD. Developed new onset atrial fibrillation. PFT completed 4/1/2022 was read as essentially normal     Morse Boxer presents to the office in follow up. He has complaints of fatigue and shortness of breath. Other than fatigue and shortness of breath he feels ok. Discussed his monitor in detail, most of his symptoms were documented with sinus rhythm. Assessment and plan:   -PAF   ECG today shows Sinus bradycardia, 1st degree AV block    MCOT 3/16/2022 showed atrial fibrillation/flutter with 1% burden      Patient has a [id number] Score of at least 4 (HTN, CAD, Age3) on eliquis 5 mg BID, eliquis is very expensive for them and he will see if Xarelto is less expensive, if it is we will change him to this     We discussed treatment options including antiarrhythmics, rate control with anticoagulation, and ablation. Medication therapy is limited as he is bradycardic at baseline     We discussed progressive nature of atrial fibrillation. Treatment success decreases when AF becomes persistent and last more than 6 months. Antiarrhythmic therapy, side effects, benefits and alternative discussed. Atrial fibrillation ablation procedure was discussed. We discussed the need for repeat procedure. On average patients may need more than one ablation procedure.      Risks associated with ablation include but not limited to allergic reaction to the medications, pain, bleeding, infection, nerve injury, injury to diaphragm(breathing muscle), pulmonary embolus(blood clot in lungs), deep vein blood clot, pneumothorax, hemothorax, acute renal failure, cardiac perforation,  tamponade, need for emergent surgery (open heart), permanent pacemaker, pulmonary vein stenosis, left atrial to esophageal fistula, stroke, myocardial infarction and death. Difference between atrial fibrillation and atrial flutter discussed and treatment discussed. He is not interested in ablation.     -Bradycardia, 1st degree AV block   Baseline ECG shows sinus sandee   Discussed symptoms to be aware of for possible worsening bradycardia   Mild sinus bradycardia. No intervention needed at this time. If symptomatic bradycardia, then ablation is recommended. -SOB/Fatigue   Will order sleep study to r/o NGUYỄN   Nocturia, frequent, may contribute to his symptoms   Could be related to atrial fibrillation   PFT 4/1/2022 was essentially normal   Symptom onset was sudden and wife does not feel it is related to his age     -DVT   Bilateral DVT   On Eliquis 5 mg BID for PAF    -CAD   [facility] 3/15/2022 showed mild non-obstructive disease    Lipitor 5 mg daily     -HTN  Not well controlled: 144/79  Continue Amlodipine 10 mg daily   BP goal <130/80  Home BP monitoring encouraged, printed information provided on how to accurately measure BP at home. Counseled to follow a low salt diet to assure blood pressure remains controlled for cardiovascular risk factor modification. The patient is counseled to get regular exercise 3-5 times per week and maintain a healthy weight reduce cardiovascular risk factors.           Patient Active Problem List    Diagnosis Date Noted    Shortness of breath 05/24/2022    Daytime sleepiness 05/24/2022    DVT of leg (deep venous thrombosis) ([facility] [address] 03/15/2022    Atrial fibrillation ([facility] 03/14/2022    Lower extremity edema 03/14/2022    Syncope and collapse 03/13/2022    HTN (hypertension) 03/13/2022    High cholesterol 03/13/2022    Depression 03/13/2022     Diagnostic studies:   ECG 5/24/22  Sinus Bradycardia   429 QTcH, 112 QRS    Echo 3/14/2022   Summary   Global left ventricular function is normal with ejection fraction estimated   from 50 % to 55 %. Normal left ventricular wall thickness. Mild mitral & tricuspid regurgitation is present. PASP 23 mmHg. Stress Test 3/15/2022   Summary    The overall quality of the study is good. There is subdiaphragmatic    attenuation.        Left ventricular cavity size is mildly dilated. The right ventricle is    normal in size.        Spect imaging demonstrates a small area of mildly decreased perfusion in the    apical inferior segment. The defect is fixed at rest and stress, consistent    with prior infarct.        Sum stress score of 5.    There is visual and calculated TID (transient ischemic dilation) of 1.39    with lexiscan.        Left ventricular ejection fraction is normal at 57%.        Myocardial perfusion is abnormal, consistent with prior infarct. High risk    scan given TID, which may indicate multivessel CAD or proximal LAD disease.        Recommendation    Discussed with Dr. Dk Giraldo           I independently reviewed the cardiac diagnostic studies, ECG and relevant imaging studies. Lab Results   Component Value Date    LVEF 55 03/15/2022    LVEFMODE Cardiac Cath 03/15/2022     No results found for: TSHFT4, TSH      Physical Examination:  Vitals:    05/24/22 1555   BP: (!) 144/79   Pulse: 64   SpO2: 97%      Wt Readings from Last 3 Encounters:   05/24/22 197 lb (89.4 kg)   03/16/22 197 lb 9.6 oz (89.6 kg)       · Constitutional: Oriented. No distress. · Head: Normocephalic and atraumatic. · Mouth/Throat: Oropharynx is clear and moist.   · Eyes: Conjunctivae normal. EOM are normal.   · Neck: Neck supple. No JVD present. · Cardiovascular: Normal rate, regular rhythm, S1&S2. · Pulmonary/Chest: Bilateral respiratory sounds. No rhonchi. · Abdominal: Soft. No tenderness. · Musculoskeletal: No tenderness.  No edema    · Lymphadenopathy: Alternatives to my treatment were discussed. I have discussed the above stated plan and the patient verbalized understanding and agreed with the plan. Scribe attestation: This note was scribed in the presence of James Menard MD by Ericka Sanabria RN  Physician Attestation: I, Dr. James Menard, confirm that the scribe's documentation has been prepared under my direction and personally reviewed by me in its entirety. I also confirm that the note above accurately reflects all work, treatment, procedures, and medical decision making performed by me. NOTE: This report was transcribed using voice recognition software. Every effort was made to ensure accuracy, however, inadvertent computerized transcription errors may be present.      James Menard MD, MPH  [place] [address]   Office: [phone]  Fax: [phone]

## 2022-05-24 ENCOUNTER — OFFICE VISIT (OUTPATIENT)
Dept: CARDIOLOGY CLINIC | Age: 82
End: 2022-05-24
Payer: MEDICARE

## 2022-05-24 VITALS
HEIGHT: 70 IN | HEART RATE: 64 BPM | BODY MASS INDEX: 28.2 KG/M2 | OXYGEN SATURATION: 97 % | WEIGHT: 197 LBS | SYSTOLIC BLOOD PRESSURE: 144 MMHG | DIASTOLIC BLOOD PRESSURE: 79 MMHG

## 2022-05-24 DIAGNOSIS — I82.4Z9 ACUTE DEEP VEIN THROMBOSIS (DVT) OF DISTAL VEIN OF LOWER EXTREMITY, UNSPECIFIED LATERALITY (HCC): ICD-10-CM

## 2022-05-24 DIAGNOSIS — R40.0 DAYTIME SLEEPINESS: ICD-10-CM

## 2022-05-24 DIAGNOSIS — I10 PRIMARY HYPERTENSION: ICD-10-CM

## 2022-05-24 DIAGNOSIS — I48.0 PAROXYSMAL ATRIAL FIBRILLATION (HCC): ICD-10-CM

## 2022-05-24 DIAGNOSIS — R06.02 SHORTNESS OF BREATH: Primary | ICD-10-CM

## 2022-05-24 PROCEDURE — 93000 ELECTROCARDIOGRAM COMPLETE: CPT | Performed by: INTERNAL MEDICINE

## 2022-05-24 PROCEDURE — G8427 DOCREV CUR MEDS BY ELIG CLIN: HCPCS | Performed by: INTERNAL MEDICINE

## 2022-05-24 PROCEDURE — G8417 CALC BMI ABV UP PARAM F/U: HCPCS | Performed by: INTERNAL MEDICINE

## 2022-05-24 PROCEDURE — 99214 OFFICE O/P EST MOD 30 MIN: CPT | Performed by: INTERNAL MEDICINE

## 2022-05-24 PROCEDURE — 1036F TOBACCO NON-USER: CPT | Performed by: INTERNAL MEDICINE

## 2022-05-24 PROCEDURE — 1123F ACP DISCUSS/DSCN MKR DOCD: CPT | Performed by: INTERNAL MEDICINE

## 2022-05-24 RX ORDER — OMEGA-3/DHA/EPA/FISH OIL 60 MG-90MG
500 CAPSULE ORAL
COMMUNITY

## 2022-06-16 ENCOUNTER — TELEPHONE (OUTPATIENT)
Dept: INTERNAL MEDICINE CLINIC | Age: 82
End: 2022-06-16

## 2022-06-16 NOTE — TELEPHONE ENCOUNTER
----- Message from Soraya Christellekaty sent at 2022 12:40 PM EDT -----  Subject: Appointment Request    Reason for Call: New Patient Request Appointment    QUESTIONS  Type of Appointment? New Patient/New to Provider  Reason for appointment request? No appointments available during search  Additional Information for Provider? Patient would like to establish care   with provider. ---------------------------------------------------------------------------  --------------  Nadia PATRICIA  What is the best way for the office to contact you? OK to leave message on   voicemail  Preferred Call Back Phone Number? [phone]  ---------------------------------------------------------------------------  --------------  SCRIPT ANSWERS  Relationship to Patient? Other  Representative Name? Page Mcdermott  Additional information verified (besides Name and Date of Birth)? Address  Specialty Confirmation? Primary Care  Is this the first appointment to establish care for a ? No  Have you been diagnosed with COVID-19 in the past 10 days? No  (Service Expert  click yes below to proceed with kapturem As Usual   Scheduling)?  Yes

## 2023-06-20 ENCOUNTER — TELEPHONE (OUTPATIENT)
Dept: CARDIOLOGY CLINIC | Age: 83
End: 2023-06-20

## 2023-06-20 NOTE — TELEPHONE ENCOUNTER
Brendon Truong called to request a copy of the Pt monitor  from 04/18. Please fax to:  [phone]. Please advise.   Thank you

## 2023-12-28 ENCOUNTER — OFFICE VISIT (OUTPATIENT)
Age: 83
End: 2023-12-28

## 2023-12-28 VITALS
OXYGEN SATURATION: 95 % | HEIGHT: 71 IN | SYSTOLIC BLOOD PRESSURE: 108 MMHG | RESPIRATION RATE: 18 BRPM | TEMPERATURE: 98.4 F | BODY MASS INDEX: 28.87 KG/M2 | WEIGHT: 206.2 LBS | DIASTOLIC BLOOD PRESSURE: 61 MMHG | HEART RATE: 69 BPM

## 2023-12-28 DIAGNOSIS — Z20.822 EXPOSURE TO COVID-19 VIRUS: ICD-10-CM

## 2023-12-28 DIAGNOSIS — U07.1 ACUTE COVID-19: Primary | ICD-10-CM

## 2023-12-28 PROBLEM — K21.9 ESOPHAGEAL REFLUX: Status: ACTIVE | Noted: 2021-08-27

## 2023-12-28 PROBLEM — N52.9 ERECTILE DYSFUNCTION: Status: ACTIVE | Noted: 2021-08-27

## 2023-12-28 PROBLEM — G57.90 MONONEUROPATHY OF LOWER EXTREMITY: Status: ACTIVE | Noted: 2021-08-27

## 2023-12-28 PROBLEM — Q61.00: Status: ACTIVE | Noted: 2021-08-27

## 2023-12-28 PROBLEM — M72.2 PLANTAR FASCIAL FIBROMATOSIS: Status: ACTIVE | Noted: 2021-08-27

## 2023-12-28 PROBLEM — F41.1 ANXIETY STATE: Status: ACTIVE | Noted: 2017-01-19

## 2023-12-28 LAB
Lab: ABNORMAL
QC PASS/FAIL: ABNORMAL
SARS-COV-2 RDRP RESP QL NAA+PROBE: POSITIVE

## 2023-12-28 NOTE — PROGRESS NOTES
bulging. Tympanic membrane has normal mobility.      Nose: No congestion or rhinorrhea.      Right Sinus: No frontal sinus tenderness.      Left Sinus: No frontal sinus tenderness.      Mouth/Throat:      Lips: Pink.      Mouth: Mucous membranes are moist. No lacerations.      Pharynx: Oropharynx is clear. Uvula midline. No oropharyngeal exudate, posterior oropharyngeal erythema or uvula swelling.      Tonsils: No tonsillar exudate. 0 on the right. 0 on the left.   Cardiovascular:      Rate and Rhythm: Normal rate and regular rhythm.      Heart sounds: No murmur heard.  Pulmonary:      Effort: No respiratory distress.      Breath sounds: Normal breath sounds.   Lymphadenopathy:      Head:      Right side of head: No submental or submandibular adenopathy.      Left side of head: No submental or submandibular adenopathy.      Cervical: No cervical adenopathy.   Skin:     General: Skin is warm and dry.      Coloration: Skin is not jaundiced.   Neurological:      Mental Status: He is alert and oriented to person, place, and time.   Psychiatric:         Mood and Affect: Mood normal.         Behavior: Behavior normal.         Thought Content: Thought content normal.         An electronic signature was used to authenticate this note.    Adis Dee, APRN - CNP

## 2025-02-26 NOTE — PROGRESS NOTES
[facility]   Electrophysiology Follow up   Date: 2/26/2025  I had the privilege of visiting Liu Staples in the office.     CC: PAF  HPI: Liu Staples is a 84 y.o. male with a PMH of Afib, HTN, CAD, and DVT.    3/13/2022 he presented to [facility] ED with complaints of dizziness and lightheadedness. He also had shortness of breath. Underwent stress testing that was abnormal and a subsequent LHC which showed Mild nonobstructive CAD. Developed new onset atrial fibrillation.      PFT completed 4/1/2022 was read as essentially normal     Saw Dr. Knutson at [facility] in 2023 after ED visit for dyspnea. LHC and echo normal. Referred to pulm for further workup.     History of Present Illness  The patient is an 84-year-old male who presents for evaluation of atrial fibrillation. He is accompanied by his wife.    He was diagnosed with atrial fibrillation approximately 2 years ago. Ablation was discussed at the time but he was not interested in.     He has been experiencing dyspnea, particularly noticeable when walking short distances. Despite undergoing several tests, the cause of his shortness of breath remains undetermined.  He had second opinion from [facility] and did left heart cath and echo which were unremarkable.    His last EKG was in July 2023, showed sinus rhythm, but has not had an ECG since then. He reports feeling fatigued and often falls asleep while sitting. He also experiences severe body aches after minimal exertion, necessitating rest periods. His wife notes that he can walk from the bedroom to the hallway without difficulty. He has been on Eliquis since his diagnosis, which he reports as being effective. He has not missed any doses of his blood thinner.  His son has undergone an ablation procedure.      Assessment & Plan  Persistent Atrial Fibrillation.   ECG today shows atrial fibrillation.   Duration of atrial fibrillation is not known.  Last ECG available was in July 2023 which was sinus rhythm.   He

## 2025-02-27 ENCOUNTER — TELEPHONE (OUTPATIENT)
Dept: CARDIOLOGY CLINIC | Age: 85
End: 2025-02-27

## 2025-02-27 ENCOUNTER — OFFICE VISIT (OUTPATIENT)
Dept: CARDIOLOGY CLINIC | Age: 85
End: 2025-02-27

## 2025-02-27 VITALS
WEIGHT: 208 LBS | DIASTOLIC BLOOD PRESSURE: 70 MMHG | BODY MASS INDEX: 29.12 KG/M2 | HEIGHT: 71 IN | OXYGEN SATURATION: 99 % | HEART RATE: 83 BPM | SYSTOLIC BLOOD PRESSURE: 126 MMHG

## 2025-02-27 DIAGNOSIS — I10 PRIMARY HYPERTENSION: ICD-10-CM

## 2025-02-27 DIAGNOSIS — I48.0 PAROXYSMAL ATRIAL FIBRILLATION (HCC): Primary | ICD-10-CM

## 2025-02-27 DIAGNOSIS — I82.4Z9 ACUTE DEEP VEIN THROMBOSIS (DVT) OF DISTAL VEIN OF LOWER EXTREMITY, UNSPECIFIED LATERALITY (HCC): ICD-10-CM

## 2025-02-27 RX ORDER — DILTIAZEM HYDROCHLORIDE 120 MG/1
120 CAPSULE, COATED, EXTENDED RELEASE ORAL DAILY
Qty: 90 CAPSULE | Refills: 1 | Status: SHIPPED | OUTPATIENT
Start: 2025-02-27

## 2025-02-27 NOTE — TELEPHONE ENCOUNTER
Pre procedure instructions are located in the after visit summary. We discussed these instructions in detail prior to patient leaving appointment.     Procedure : DCCV     pt arrives from cardiologist with c/o intermittent chest pain x 2 months. given 1 s/l nitro, 324 asa and #20 s/l L f/a

## 2025-02-27 NOTE — PATIENT INSTRUCTIONS
You are scheduled for a Cardioversion    Our  will call you to discuss a date for your procedure.    The day of your procedure you will need to check in at the Registration Desk in the Main Lobby.    PRE-PROCEDURE INSTRUCTIONS   Do not eat or drink anything after midnight the night before your procedure.   Take all your medications with a sip of water in the morning.   Do not hold your eliquis   Please have a responsible adult to drive you home after your procedure.    If you have any questions regarding your procedure itself or your medications, please call [phone] and ask to speak to an EP nurse.

## 2025-03-03 ENCOUNTER — TELEPHONE (OUTPATIENT)
Dept: CARDIOLOGY CLINIC | Age: 85
End: 2025-03-03

## 2025-03-03 NOTE — TELEPHONE ENCOUNTER
Patient's wife Endy Hernandez is reaching out to schedule Liu's cardioversion, please call her at [phone]

## 2025-03-04 NOTE — TELEPHONE ENCOUNTER
Procedure: DCCV    Date Of Procedure:  03/12/25    Time Of Arrival: 730AM    Procedure Time: 830AM       Called and spoke to patient and he is agreeable to the date and time. Reviewed the Pre-Procedure instructions and they verbalized understanding. Encouraged to call with any questions or concerns.       Published on Elli / emailed to Cath lab / note in chart / scheduled in Epic/Cupid/Carto

## 2025-03-12 ENCOUNTER — ANCILLARY PROCEDURE (OUTPATIENT)
Dept: CARDIOLOGY CLINIC | Age: 85
End: 2025-03-12

## 2025-03-12 ENCOUNTER — HOSPITAL ENCOUNTER (OUTPATIENT)
Age: 85
Discharge: HOME | End: 2025-03-14
Attending: INTERNAL MEDICINE
Payer: MEDICARE

## 2025-03-12 VITALS
RESPIRATION RATE: 18 BRPM | OXYGEN SATURATION: 100 % | HEART RATE: 61 BPM | TEMPERATURE: 98 F | HEIGHT: 70 IN | SYSTOLIC BLOOD PRESSURE: 171 MMHG | DIASTOLIC BLOOD PRESSURE: 90 MMHG | BODY MASS INDEX: 29.78 KG/M2 | WEIGHT: 208 LBS

## 2025-03-12 DIAGNOSIS — I48.91 ATRIAL FIBRILLATION, UNSPECIFIED TYPE (HCC): ICD-10-CM

## 2025-03-12 DIAGNOSIS — I48.91 ATRIAL FIBRILLATION, UNSPECIFIED TYPE (HCC): Primary | ICD-10-CM

## 2025-03-12 DIAGNOSIS — I48.0 PAROXYSMAL ATRIAL FIBRILLATION (HCC): ICD-10-CM

## 2025-03-12 LAB
ECHO BSA: 2.16 M2
EKG DIAGNOSIS: NORMAL
EKG DIAGNOSIS: NORMAL
EKG Q-T INTERVAL: 332 MS
EKG Q-T INTERVAL: 378 MS
EKG QRS DURATION: 102 MS
EKG QRS DURATION: 104 MS
EKG QTC CALCULATION (BAZETT): 412 MS
EKG QTC CALCULATION (BAZETT): 427 MS
EKG R AXIS: -48 DEGREES
EKG R AXIS: -52 DEGREES
EKG T AXIS: 58 DEGREES
EKG T AXIS: 76 DEGREES
EKG VENTRICULAR RATE: 77 BPM
EKG VENTRICULAR RATE: 93 BPM

## 2025-03-12 PROCEDURE — 7100000011 HC PHASE II RECOVERY - ADDTL 15 MIN: Performed by: INTERNAL MEDICINE

## 2025-03-12 PROCEDURE — 92960 CARDIOVERSION ELECTRIC EXT: CPT

## 2025-03-12 PROCEDURE — 7100000010 HC PHASE II RECOVERY - FIRST 15 MIN: Performed by: INTERNAL MEDICINE

## 2025-03-12 PROCEDURE — 2500000003 HC RX 250 WO HCPCS: Performed by: INTERNAL MEDICINE

## 2025-03-12 RX ADMIN — METHOHEXITAL SODIUM 70 MG: 500 INJECTION, POWDER, LYOPHILIZED, FOR SOLUTION INTRAMUSCULAR; INTRAVENOUS; RECTAL at 08:48

## 2025-03-12 NOTE — H&P
Musculoskeletal: No edema    Neurological: Alert and oriented. Follows command    Review of System:  [x] Full ROS obtained and negative except as mentioned in HPI    Prior to Admission medications    Medication Sig Start Date End Date Taking? Authorizing Provider   Omega-3 Fatty Acids (FISH OIL) 500 MG CAPS Take 500 mg by mouth daily (with breakfast)    Linda Fowler MD   Vitamin Mixture (VITAMIN E COMPLETE) CAPS Take by mouth    Linda Fowler MD   apixaban (ELIQUIS) 5 MG TABS tablet Take 1 tablet by mouth 2 times daily 3/16/22   Jose Palacios MD   sertraline (ZOLOFT) 50 MG tablet Take 1 tablet by mouth daily    Linda Fowler MD   amLODIPine (NORVASC) 10 MG tablet Take 1 tablet by mouth daily    Linda Fowler MD   atorvastatin (LIPITOR) 10 MG tablet Take 0.5 tablets by mouth daily    Linda Fowler MD   diphenhydrAMINE-APAP, sleep, (TYLENOL PM EXTRA STRENGTH)  MG tablet Take 1 tablet by mouth nightly as needed for Sleep    Linda Fowler MD   Multiple Vitamins-Minerals (THERAPEUTIC MULTIVITAMIN-MINERALS) tablet Take 1 tablet by mouth daily    Linda Fowler MD   loratadine (CLARITIN) 10 MG tablet Take 1 tablet by mouth at bedtime    ProviderLinda MD       No Known Allergies    Social History:  Reviewed.  reports that he has quit smoking. He has never used smokeless tobacco.     Family History:  Reviewed. Reviewed. No family history of SCD.      Relevant and available labs, and cardiovascular diagnostics reviewed.   Reviewed.

## 2025-03-12 NOTE — NURSING NOTE
Holter requested for pt.  Device was registered and placed.Tutorial given, pt verbalized understanding.    Serial Number: [id number]

## 2025-04-09 LAB — ECHO BSA: 2.16 M2

## 2025-04-11 ENCOUNTER — APPOINTMENT (OUTPATIENT)
Dept: CT IMAGING | Age: 85
End: 2025-04-11
Payer: MEDICARE

## 2025-04-11 ENCOUNTER — APPOINTMENT (OUTPATIENT)
Dept: GENERAL RADIOLOGY | Age: 85
End: 2025-04-11
Payer: MEDICARE

## 2025-04-11 ENCOUNTER — HOSPITAL ENCOUNTER (EMERGENCY)
Age: 85
Discharge: HOME | End: 2025-04-11
Payer: MEDICARE

## 2025-04-11 VITALS
RESPIRATION RATE: 16 BRPM | TEMPERATURE: 97.7 F | WEIGHT: 200 LBS | SYSTOLIC BLOOD PRESSURE: 150 MMHG | DIASTOLIC BLOOD PRESSURE: 86 MMHG | OXYGEN SATURATION: 96 % | BODY MASS INDEX: 28.63 KG/M2 | HEART RATE: 82 BPM | HEIGHT: 70 IN

## 2025-04-11 DIAGNOSIS — M79.605 LEFT LEG PAIN: Primary | ICD-10-CM

## 2025-04-11 PROCEDURE — 6370000000 HC RX 637 (ALT 250 FOR IP): Performed by: PHYSICIAN ASSISTANT

## 2025-04-11 PROCEDURE — 72131 CT LUMBAR SPINE W/O DYE: CPT

## 2025-04-11 PROCEDURE — 73590 X-RAY EXAM OF LOWER LEG: CPT

## 2025-04-11 PROCEDURE — 99284 EMERGENCY DEPT VISIT MOD MDM: CPT

## 2025-04-11 RX ORDER — TRAMADOL HYDROCHLORIDE 50 MG/1
25-50 TABLET ORAL EVERY 8 HOURS PRN
Qty: 10 TABLET | Refills: 0 | Status: SHIPPED | OUTPATIENT
Start: 2025-04-11 | End: 2025-04-14

## 2025-04-11 RX ORDER — METHYLPREDNISOLONE 4 MG/1
2 TABLET ORAL DAILY
Qty: 3 TABLET | Refills: 0 | Status: SHIPPED | OUTPATIENT
Start: 2025-04-11 | End: 2025-04-17

## 2025-04-11 RX ORDER — TRAMADOL HYDROCHLORIDE 50 MG/1
50 TABLET ORAL ONCE
Status: COMPLETED | OUTPATIENT
Start: 2025-04-11 | End: 2025-04-11

## 2025-04-11 RX ADMIN — TRAMADOL HYDROCHLORIDE 50 MG: 50 TABLET, COATED ORAL at 08:42

## 2025-04-11 ASSESSMENT — PAIN - FUNCTIONAL ASSESSMENT: PAIN_FUNCTIONAL_ASSESSMENT: 0-10

## 2025-04-11 ASSESSMENT — ENCOUNTER SYMPTOMS
SHORTNESS OF BREATH: 0
NAUSEA: 0
DIARRHEA: 0
VOMITING: 0
COUGH: 0
WHEEZING: 0
RHINORRHEA: 0
ABDOMINAL PAIN: 0

## 2025-04-11 ASSESSMENT — PAIN SCALES - GENERAL: PAINLEVEL_OUTOF10: 8

## 2025-04-11 ASSESSMENT — LIFESTYLE VARIABLES
HOW MANY STANDARD DRINKS CONTAINING ALCOHOL DO YOU HAVE ON A TYPICAL DAY: PATIENT DOES NOT DRINK
HOW OFTEN DO YOU HAVE A DRINK CONTAINING ALCOHOL: NEVER

## 2025-04-11 NOTE — ED PROVIDER NOTES
[facility] EMERGENCY DEPARTMENT  EMERGENCY DEPARTMENT ENCOUNTER        Pt Name: Liu Staples  MRN: [id number]  Birthdate 1940  Date of evaluation: 4/11/2025  Provider: Sheila Lawrence PA-C  PCP: Liu Mcintyre MD  Note Started: 8:00 AM EDT 4/11/25      DAVIS. I have evaluated this patient.        CHIEF COMPLAINT       Chief Complaint   Patient presents with    Leg Pain     Left leg pain onset 8 weeks ago. Pain worsened last evening, having difficulty bearing weight on left leg. Pain is lateral on leg, near and distal to knee. Pain is worse when bearing weight. 8/10 pain at triage.       HISTORY OF PRESENT ILLNESS: 1 or more Elements     History From: patient, patient's wife   Limitations to history : None    Liu Stapels is a 84 y.o. male who presents for evaluation of left leg pain.  Patient states his pain began 8 weeks ago and was exacerbated last night.  States that he went to put pressure on his left leg and that it \"gave out \"followed by immediate severe pain.  His pain starts on the lateral aspect of his left leg inferior to the knee joint line and radiates down the lateral aspect of his leg.  Patient states that his pain also radiates up to his thigh and into his left glute. patient states his pain is aggravated by bearing weight.  Patient has tried Tylenol with no relief.  Patient has not taken Tylenol today.  Patient rates that the pain was originally at 0/10 now its 8/10.  Patient states that he could not bear weight so he is ambulating with crutches.  Patient does have a history of spinal stenosis and neuropathy in his bilateral feet and he had surgery for this \"16 years ago \".  Patient is anticoagulated on Eliquis for his A-fib.  He states he did not take it today just in case he was given medications here.  Patient denies any other injury.  No new numbness distally.  No saddle anesthesia, bladder retention or bowel incontinence.  No fevers or chills.  He denies weakness,        DISCONTINUED MEDICATIONS:  Discontinued Medications    No medications on file              (Please note that portions of this note were completed with a voice recognition program.  Efforts were made to edit the dictations but occasionally words are mis-transcribed.)    Sheila Lawrence PA-C (electronically signed)        Sheila Lawrence PA-C  04/11/25 1001       Sheila Lawrence PA-C  04/11/25 6211

## 2025-04-23 ENCOUNTER — TELEPHONE (OUTPATIENT)
Dept: CARDIOLOGY CLINIC | Age: 85
End: 2025-04-23

## 2025-04-23 DIAGNOSIS — I48.0 PAROXYSMAL ATRIAL FIBRILLATION (HCC): Primary | ICD-10-CM

## 2025-04-23 NOTE — TELEPHONE ENCOUNTER
Pt wife Endy Hernandez call wanting know if the office has there monitor results back?    Pl is very SOB, just walking to the mailbox.  Pt is getting depressed because they are used to doing things.    Ms Hubert stated they have future appt pt is on 6/2 and hers is on 5/16. With [facility]    They are requesting appts for the same day    [phone]

## 2025-04-24 NOTE — TELEPHONE ENCOUNTER
Called and discussed results with patients wife. Discussed patients ongoing issues with SOB. Per RMM note workup has been unremarkable and unable to determine reasoning. She asked about pulmonology/ sleep study since they previously refused it. Put in a referral and provided her with the number to call. Discussed appointment date details in other encounter.

## 2025-04-25 ENCOUNTER — TELEPHONE (OUTPATIENT)
Dept: CARDIOLOGY CLINIC | Age: 85
End: 2025-04-25

## 2025-04-25 NOTE — TELEPHONE ENCOUNTER
The patient is requesting a printed script for (Eliquis 5mg).   Patient will  the prescription at the office, please call when ready - Thank you

## 2025-05-13 NOTE — PROGRESS NOTES
[facility]   Electrophysiology Office Visit    Date: 5/16/2025  EP Attending: Dr. Valencia     Chief Complaint:   Chief Complaint   Patient presents with    Atrial Fibrillation     History of Present Illness: History obtained from patient and medical record.    Liu Staples is 84 y.o. male with a past medical history of  Afib, HTN, CAD, and DVT.     3/13/2022 he presented to [facility] ED with complaints of dizziness and lightheadedness. He also had shortness of breath. Underwent stress testing that was abnormal and a subsequent LHC which showed Mild nonobstructive CAD. Developed new onset atrial fibrillation.       PFT completed 4/1/2022 was read as essentially normal      Saw Dr. Knutson at [facility] in 2023 after ED visit for dyspnea. LHC and echo normal. Referred to pulm for further workup.     S/p successful DCCV of atrial fibrillation 3/2025     Interval history: Today, Liu Staples is being seen for routine follow up. He is doing well from a cardiac standpoint. ECG today shows sinus rhythm. Holter monitor results reviewed. He is not aware when he has episodes of atrial fibrillation.     Denies having chest pain, palpitations, shortness of breath, orthopnea/PND, cough, or dizziness at the time of this visit.    With regard to medication therapy the patient has been compliant with prescribed regimen. They have tolerated therapy to date.     Assessment and Plan:    Persistent Atrial Fibrillation  -S/p DCCV 3/12/2025  -ECG today shows SR  -8.4 % burden on recent Holter   -Asymptomatic with episodes   -TSH 2.97 (2022)  -[id number] Score is high. He is high risk for thromboembolic event. Anticoagulation is recommended- Continue Eliquis 5 mg BID  - Cardizem 120 mg QD  -Treatment options including cardioversion, antiarrhythmic medications, rhythm control vs rate control and ablation were discussed with patient.He is not interested in ablation at this time, would like to continue with medical therapy.   - He monitors

## 2025-05-16 ENCOUNTER — OFFICE VISIT (OUTPATIENT)
Dept: CARDIOLOGY CLINIC | Age: 85
End: 2025-05-16
Payer: MEDICARE

## 2025-05-16 VITALS
SYSTOLIC BLOOD PRESSURE: 138 MMHG | HEIGHT: 70 IN | DIASTOLIC BLOOD PRESSURE: 70 MMHG | BODY MASS INDEX: 28.63 KG/M2 | HEART RATE: 60 BPM | OXYGEN SATURATION: 98 % | WEIGHT: 200 LBS

## 2025-05-16 DIAGNOSIS — I10 HYPERTENSION, UNSPECIFIED TYPE: ICD-10-CM

## 2025-05-16 DIAGNOSIS — I48.0 PAROXYSMAL ATRIAL FIBRILLATION (HCC): Primary | ICD-10-CM

## 2025-05-16 PROCEDURE — 99214 OFFICE O/P EST MOD 30 MIN: CPT

## 2025-05-16 PROCEDURE — G8428 CUR MEDS NOT DOCUMENT: HCPCS

## 2025-05-16 PROCEDURE — 93000 ELECTROCARDIOGRAM COMPLETE: CPT

## 2025-05-16 PROCEDURE — G8419 CALC BMI OUT NRM PARAM NOF/U: HCPCS

## 2025-05-16 PROCEDURE — 1036F TOBACCO NON-USER: CPT

## 2025-05-16 PROCEDURE — G2211 COMPLEX E/M VISIT ADD ON: HCPCS

## 2025-05-16 PROCEDURE — 1123F ACP DISCUSS/DSCN MKR DOCD: CPT

## 2025-05-16 RX ORDER — ATORVASTATIN CALCIUM 10 MG/1
10 TABLET, FILM COATED ORAL DAILY
Qty: 90 TABLET | Refills: 1 | Status: SHIPPED | OUTPATIENT
Start: 2025-05-16

## 2025-05-16 RX ORDER — SERTRALINE HYDROCHLORIDE 100 MG/1
100 TABLET, FILM COATED ORAL DAILY
COMMUNITY
Start: 2025-04-30

## 2025-05-18 PROCEDURE — 99285 EMERGENCY DEPT VISIT HI MDM: CPT

## 2025-05-18 PROCEDURE — 93005 ELECTROCARDIOGRAM TRACING: CPT | Performed by: EMERGENCY MEDICINE

## 2025-05-19 ENCOUNTER — HOSPITAL ENCOUNTER (EMERGENCY)
Age: 85
Discharge: HOME | End: 2025-05-19
Attending: EMERGENCY MEDICINE
Payer: MEDICARE

## 2025-05-19 ENCOUNTER — APPOINTMENT (OUTPATIENT)
Dept: GENERAL RADIOLOGY | Age: 85
End: 2025-05-19
Payer: MEDICARE

## 2025-05-19 VITALS
RESPIRATION RATE: 14 BRPM | OXYGEN SATURATION: 96 % | DIASTOLIC BLOOD PRESSURE: 99 MMHG | HEART RATE: 78 BPM | TEMPERATURE: 97.8 F | SYSTOLIC BLOOD PRESSURE: 167 MMHG

## 2025-05-19 DIAGNOSIS — R03.0 ELEVATED BLOOD PRESSURE READING: Primary | ICD-10-CM

## 2025-05-19 DIAGNOSIS — Z72.820 SLEEP DEPRIVATION: ICD-10-CM

## 2025-05-19 DIAGNOSIS — F41.1 ANXIETY STATE: ICD-10-CM

## 2025-05-19 LAB
ALBUMIN SERPL-MCNC: 4.2 G/DL (ref 3.4–5)
ALBUMIN/GLOB SERPL: 1.8 {RATIO} (ref 1.1–2.2)
ALP SERPL-CCNC: 30 U/L (ref 40–129)
ALT SERPL-CCNC: 20 U/L (ref 10–40)
ANION GAP SERPL CALCULATED.3IONS-SCNC: 11 MMOL/L (ref 3–16)
AST SERPL-CCNC: 20 U/L (ref 15–37)
BASOPHILS # BLD: 0 K/UL (ref 0–0.2)
BASOPHILS NFR BLD: 0.5 %
BILIRUB SERPL-MCNC: 1.4 MG/DL (ref 0–1)
BUN SERPL-MCNC: 21 MG/DL (ref 7–20)
CALCIUM SERPL-MCNC: 9.6 MG/DL (ref 8.3–10.6)
CHLORIDE SERPL-SCNC: 102 MMOL/L (ref 99–110)
CO2 SERPL-SCNC: 23 MMOL/L (ref 21–32)
CREAT SERPL-MCNC: 0.9 MG/DL (ref 0.8–1.3)
DEPRECATED RDW RBC AUTO: 13.4 % (ref 12.4–15.4)
EKG DIAGNOSIS: NORMAL
EKG Q-T INTERVAL: 348 MS
EKG QRS DURATION: 102 MS
EKG QTC CALCULATION (BAZETT): 435 MS
EKG R AXIS: -44 DEGREES
EKG T AXIS: 48 DEGREES
EKG VENTRICULAR RATE: 94 BPM
EOSINOPHIL # BLD: 0 K/UL (ref 0–0.6)
EOSINOPHIL NFR BLD: 0.6 %
GFR SERPLBLD CREATININE-BSD FMLA CKD-EPI: 83 ML/MIN/{1.73_M2}
GLUCOSE SERPL-MCNC: 125 MG/DL (ref 70–99)
HCT VFR BLD AUTO: 40.9 % (ref 40.5–52.5)
HGB BLD-MCNC: 14 G/DL (ref 13.5–17.5)
LYMPHOCYTES # BLD: 1.9 K/UL (ref 1–5.1)
LYMPHOCYTES NFR BLD: 25.4 %
MCH RBC QN AUTO: 31.6 PG (ref 26–34)
MCHC RBC AUTO-ENTMCNC: 34.3 G/DL (ref 31–36)
MCV RBC AUTO: 92.3 FL (ref 80–100)
MONOCYTES # BLD: 0.8 K/UL (ref 0–1.3)
MONOCYTES NFR BLD: 10.8 %
NEUTROPHILS # BLD: 4.6 K/UL (ref 1.7–7.7)
NEUTROPHILS NFR BLD: 62.7 %
NT-PROBNP SERPL-MCNC: 732 PG/ML (ref 0–449)
PLATELET # BLD AUTO: 163 K/UL (ref 135–450)
PMV BLD AUTO: 8.2 FL (ref 5–10.5)
POTASSIUM SERPL-SCNC: 4.2 MMOL/L (ref 3.5–5.1)
PROT SERPL-MCNC: 6.5 G/DL (ref 6.4–8.2)
RBC # BLD AUTO: 4.43 M/UL (ref 4.2–5.9)
SODIUM SERPL-SCNC: 136 MMOL/L (ref 136–145)
TROPONIN, HIGH SENSITIVITY: 13 NG/L (ref 0–22)
TROPONIN, HIGH SENSITIVITY: 25 NG/L (ref 0–22)
WBC # BLD AUTO: 7.4 K/UL (ref 4–11)

## 2025-05-19 PROCEDURE — 36415 COLL VENOUS BLD VENIPUNCTURE: CPT

## 2025-05-19 PROCEDURE — 85025 COMPLETE CBC W/AUTO DIFF WBC: CPT

## 2025-05-19 PROCEDURE — 71045 X-RAY EXAM CHEST 1 VIEW: CPT

## 2025-05-19 PROCEDURE — 83880 ASSAY OF NATRIURETIC PEPTIDE: CPT

## 2025-05-19 PROCEDURE — 93010 ELECTROCARDIOGRAM REPORT: CPT | Performed by: INTERNAL MEDICINE

## 2025-05-19 PROCEDURE — 80053 COMPREHEN METABOLIC PANEL: CPT

## 2025-05-19 PROCEDURE — 84484 ASSAY OF TROPONIN QUANT: CPT

## 2025-05-19 PROCEDURE — 6370000000 HC RX 637 (ALT 250 FOR IP): Performed by: EMERGENCY MEDICINE

## 2025-05-19 RX ORDER — HYDROXYZINE HYDROCHLORIDE 10 MG/1
10 TABLET, FILM COATED ORAL ONCE
Status: COMPLETED | OUTPATIENT
Start: 2025-05-19 | End: 2025-05-19

## 2025-05-19 RX ORDER — DILTIAZEM HYDROCHLORIDE 180 MG/1
180 CAPSULE, COATED, EXTENDED RELEASE ORAL DAILY
Qty: 30 CAPSULE | Refills: 0 | Status: SHIPPED | OUTPATIENT
Start: 2025-05-19 | End: 2025-05-20 | Stop reason: SDUPTHER

## 2025-05-19 RX ORDER — DILTIAZEM HYDROCHLORIDE 30 MG/1
60 TABLET, FILM COATED ORAL ONCE
Status: COMPLETED | OUTPATIENT
Start: 2025-05-19 | End: 2025-05-19

## 2025-05-19 RX ORDER — HYDROXYZINE HYDROCHLORIDE 25 MG/1
25 TABLET, FILM COATED ORAL EVERY 8 HOURS PRN
Qty: 21 TABLET | Refills: 0 | Status: ON HOLD | OUTPATIENT
Start: 2025-05-19 | End: 2025-05-23 | Stop reason: HOSPADM

## 2025-05-19 RX ADMIN — HYDROXYZINE HYDROCHLORIDE 10 MG: 10 TABLET ORAL at 02:50

## 2025-05-19 RX ADMIN — DILTIAZEM HYDROCHLORIDE 60 MG: 30 TABLET, FILM COATED ORAL at 01:04

## 2025-05-19 ASSESSMENT — LIFESTYLE VARIABLES
HOW OFTEN DO YOU HAVE A DRINK CONTAINING ALCOHOL: NEVER
HOW MANY STANDARD DRINKS CONTAINING ALCOHOL DO YOU HAVE ON A TYPICAL DAY: PATIENT DOES NOT DRINK

## 2025-05-19 ASSESSMENT — PAIN SCALES - GENERAL: PAINLEVEL_OUTOF10: 0

## 2025-05-19 NOTE — DISCHARGE INSTR - COC
patient):  {CHP DME Belongings:006237983}    RN SIGNATURE:  {Esignature:[id number]}    CASE MANAGEMENT/SOCIAL WORK SECTION    Inpatient Status Date: ***    Readmission Risk Assessment Score:  [facility] RISK OF UNPLANNED READMISSION 2.0             0 Total Score        Discharging to Facility/ Agency   Name:   Address:  Phone:  Fax:    Dialysis Facility (if applicable)   Name:  Address:  Dialysis Schedule:  Phone:  Fax:    / signature: {Esignature:[id number]}    PHYSICIAN SECTION    Prognosis: {Prognosis:6268544220}    Condition at Discharge: { Patient Condition:833354288}    Rehab Potential (if transferring to Rehab): {Prognosis:6109863729}    Recommended Labs or Other Treatments After Discharge: ***    Physician Certification: I certify the above information and transfer of Liu Staples  is necessary for the continuing treatment of the diagnosis listed and that he requires {Admit to Appropriate Level of Care:76463} for {GREATER/LESS:100123270} 30 days.     Update Admission H&P: {CHP DME Changes in HandP:608472773}    PHYSICIAN SIGNATURE:  {Esignature:751769685}

## 2025-05-19 NOTE — ED PROVIDER NOTES
I have discussed the diagnosis and risks, and we agree with discharging home to follow-up with PCP. We also discussed returning to the Emergency Department immediately if new or worsening symptoms occur. We have discussed the symptoms which are most concerning (e.g., changing or worsening pain, weakness, vomiting, fever) that necessitate immediate return.     Clinical Impression:  1. Elevated blood pressure reading    2. Anxiety state    3. Sleep deprivation          Disposition:  Discharge to home in good condition.    Blood pressure (!) 167/99, pulse 78, temperature 97.8 °F (36.6 °C), resp. rate 14, SpO2 96%.    Patient was given scripts for the following medications. I counseled patient how to take these medications.   Discharge Medication List as of 5/19/2025  2:51 AM        START taking these medications    Details   hydrOXYzine HCl (ATARAX) 25 MG tablet Take 1 tablet by mouth every 8 hours as needed for Itching, Disp-21 tablet, R-0Normal      melatonin 3 MG TABS tablet Take 1 tablet by mouth nightly as needed (trouble sleeping), Disp-14 tablet, R-0Normal           Discharge Medication List as of 5/19/2025  2:51 AM        CONTINUE these medications which have CHANGED    Details   dilTIAZem (CARDIZEM CD) 180 MG extended release capsule Take 1 capsule by mouth daily, Disp-30 capsule, R-0Normal             Disposition referral (if applicable):  Your primary care doctor    Schedule an appointment as soon as possible for a visit in 3 days        Shawn PONCE am the primary attending of record and contributed the majority of evaluation and treatment of emergent care for this encounter.     Total critical care time is 0 minutes, which excludes separately billable procedures and updating family. Time spent is specifically for management of the presenting complaint and symptoms initially, direct bedside care, reevaluation, review of records, and consultation.  There was a high probability of clinically significant

## 2025-05-20 RX ORDER — DILTIAZEM HYDROCHLORIDE 180 MG/1
180 CAPSULE, COATED, EXTENDED RELEASE ORAL DAILY
Qty: 90 CAPSULE | Refills: 3 | Status: ON HOLD | OUTPATIENT
Start: 2025-05-20 | End: 2025-05-23 | Stop reason: HOSPADM

## 2025-05-20 NOTE — TELEPHONE ENCOUNTER
Received refill request for diltiazem  from [facility] pharmacy.     Last OV: 5/16/2025 NPRJ     Next OV: 06/02/2025 NPRJ    Last Labs: 5/18/2025 EKG

## 2025-05-21 ENCOUNTER — APPOINTMENT (OUTPATIENT)
Dept: CT IMAGING | Age: 85
End: 2025-05-21
Payer: MEDICARE

## 2025-05-21 ENCOUNTER — HOSPITAL ENCOUNTER (OUTPATIENT)
Age: 85
Setting detail: OBSERVATION
Discharge: HOME | End: 2025-05-24
Attending: STUDENT IN AN ORGANIZED HEALTH CARE EDUCATION/TRAINING PROGRAM
Payer: MEDICARE

## 2025-05-21 DIAGNOSIS — K86.89 PANCREATIC MASS: ICD-10-CM

## 2025-05-21 DIAGNOSIS — I25.83 CORONARY ATHEROSCLEROSIS DUE TO LIPID RICH PLAQUE: ICD-10-CM

## 2025-05-21 DIAGNOSIS — I50.9 ACUTE CONGESTIVE HEART FAILURE, UNSPECIFIED HEART FAILURE TYPE (HCC): ICD-10-CM

## 2025-05-21 DIAGNOSIS — I15.9 SECONDARY HYPERTENSION: Primary | ICD-10-CM

## 2025-05-21 DIAGNOSIS — R16.0 LIVER MASS: ICD-10-CM

## 2025-05-21 DIAGNOSIS — I71.9 AORTIC ANEURYSM WITHOUT RUPTURE, UNSPECIFIED PORTION OF AORTA: ICD-10-CM

## 2025-05-21 DIAGNOSIS — N17.9 AKI (ACUTE KIDNEY INJURY): ICD-10-CM

## 2025-05-21 DIAGNOSIS — I16.1 HYPERTENSIVE EMERGENCY: ICD-10-CM

## 2025-05-21 LAB
ALBUMIN SERPL-MCNC: 3.9 G/DL (ref 3.4–5)
ALBUMIN/GLOB SERPL: 1.9 {RATIO} (ref 1.1–2.2)
ALP SERPL-CCNC: 29 U/L (ref 40–129)
ALT SERPL-CCNC: 18 U/L (ref 10–40)
ANION GAP SERPL CALCULATED.3IONS-SCNC: 9 MMOL/L (ref 3–16)
APTT BLD: 29.9 SEC (ref 22.1–36.4)
AST SERPL-CCNC: 21 U/L (ref 15–37)
BASOPHILS # BLD: 0 K/UL (ref 0–0.2)
BASOPHILS NFR BLD: 0.5 %
BILIRUB SERPL-MCNC: 1.2 MG/DL (ref 0–1)
BUN SERPL-MCNC: 23 MG/DL (ref 7–20)
CALCIUM SERPL-MCNC: 9.4 MG/DL (ref 8.3–10.6)
CHLORIDE SERPL-SCNC: 103 MMOL/L (ref 99–110)
CO2 SERPL-SCNC: 25 MMOL/L (ref 21–32)
CREAT SERPL-MCNC: 1.4 MG/DL (ref 0.8–1.3)
DEPRECATED RDW RBC AUTO: 13.3 % (ref 12.4–15.4)
EOSINOPHIL # BLD: 0.1 K/UL (ref 0–0.6)
EOSINOPHIL NFR BLD: 0.7 %
GFR SERPLBLD CREATININE-BSD FMLA CKD-EPI: 49 ML/MIN/{1.73_M2}
GLUCOSE SERPL-MCNC: 106 MG/DL (ref 70–99)
HCT VFR BLD AUTO: 38.5 % (ref 40.5–52.5)
HGB BLD-MCNC: 13.3 G/DL (ref 13.5–17.5)
INR PPP: 1.25 (ref 0.85–1.15)
LYMPHOCYTES # BLD: 1.5 K/UL (ref 1–5.1)
LYMPHOCYTES NFR BLD: 21.2 %
MCH RBC QN AUTO: 31.6 PG (ref 26–34)
MCHC RBC AUTO-ENTMCNC: 34.6 G/DL (ref 31–36)
MCV RBC AUTO: 91.5 FL (ref 80–100)
MONOCYTES # BLD: 0.8 K/UL (ref 0–1.3)
MONOCYTES NFR BLD: 10.6 %
NEUTROPHILS # BLD: 4.8 K/UL (ref 1.7–7.7)
NEUTROPHILS NFR BLD: 67 %
NT-PROBNP SERPL-MCNC: 1179 PG/ML (ref 0–449)
PLATELET # BLD AUTO: 161 K/UL (ref 135–450)
PMV BLD AUTO: 7.7 FL (ref 5–10.5)
POTASSIUM SERPL-SCNC: 4 MMOL/L (ref 3.5–5.1)
PROT SERPL-MCNC: 6 G/DL (ref 6.4–8.2)
PROTHROMBIN TIME: 15.9 SEC (ref 11.9–14.9)
RBC # BLD AUTO: 4.21 M/UL (ref 4.2–5.9)
SODIUM SERPL-SCNC: 137 MMOL/L (ref 136–145)
TROPONIN, HIGH SENSITIVITY: 26 NG/L (ref 0–22)
WBC # BLD AUTO: 7.2 K/UL (ref 4–11)

## 2025-05-21 PROCEDURE — 85025 COMPLETE CBC W/AUTO DIFF WBC: CPT

## 2025-05-21 PROCEDURE — 85730 THROMBOPLASTIN TIME PARTIAL: CPT

## 2025-05-21 PROCEDURE — 6360000004 HC RX CONTRAST MEDICATION: Performed by: PHYSICIAN ASSISTANT

## 2025-05-21 PROCEDURE — 99285 EMERGENCY DEPT VISIT HI MDM: CPT

## 2025-05-21 PROCEDURE — 93005 ELECTROCARDIOGRAM TRACING: CPT | Performed by: PHYSICIAN ASSISTANT

## 2025-05-21 PROCEDURE — 85610 PROTHROMBIN TIME: CPT

## 2025-05-21 PROCEDURE — 84484 ASSAY OF TROPONIN QUANT: CPT

## 2025-05-21 PROCEDURE — 80053 COMPREHEN METABOLIC PANEL: CPT

## 2025-05-21 PROCEDURE — 74174 CTA ABD&PLVS W/CONTRAST: CPT

## 2025-05-21 PROCEDURE — 83880 ASSAY OF NATRIURETIC PEPTIDE: CPT

## 2025-05-21 RX ORDER — IOPAMIDOL 755 MG/ML
75 INJECTION, SOLUTION INTRAVASCULAR
Status: COMPLETED | OUTPATIENT
Start: 2025-05-21 | End: 2025-05-21

## 2025-05-21 RX ADMIN — IOPAMIDOL 75 ML: 755 INJECTION, SOLUTION INTRAVENOUS at 22:13

## 2025-05-21 ASSESSMENT — LIFESTYLE VARIABLES
HOW OFTEN DO YOU HAVE A DRINK CONTAINING ALCOHOL: MONTHLY OR LESS
HOW MANY STANDARD DRINKS CONTAINING ALCOHOL DO YOU HAVE ON A TYPICAL DAY: 1 OR 2

## 2025-05-21 ASSESSMENT — PAIN - FUNCTIONAL ASSESSMENT: PAIN_FUNCTIONAL_ASSESSMENT: NONE - DENIES PAIN

## 2025-05-22 ENCOUNTER — APPOINTMENT (OUTPATIENT)
Dept: MRI IMAGING | Age: 85
End: 2025-05-22
Payer: MEDICARE

## 2025-05-22 ENCOUNTER — APPOINTMENT (OUTPATIENT)
Dept: VASCULAR LAB | Age: 85
End: 2025-05-22
Attending: STUDENT IN AN ORGANIZED HEALTH CARE EDUCATION/TRAINING PROGRAM
Payer: MEDICARE

## 2025-05-22 ENCOUNTER — APPOINTMENT (OUTPATIENT)
Age: 85
End: 2025-05-22
Payer: MEDICARE

## 2025-05-22 PROBLEM — N17.9 AKI (ACUTE KIDNEY INJURY): Status: ACTIVE | Noted: 2025-05-22

## 2025-05-22 PROBLEM — I16.1 HYPERTENSIVE EMERGENCY: Status: ACTIVE | Noted: 2025-05-22

## 2025-05-22 LAB
ECHO AO ROOT DIAM: 3.8 CM
ECHO AO ROOT INDEX: 1.84 CM/M2
ECHO AV AREA PEAK VELOCITY: 2.5 CM2
ECHO AV AREA VTI: 2.2 CM2
ECHO AV AREA/BSA PEAK VELOCITY: 1.2 CM2/M2
ECHO AV AREA/BSA VTI: 1.1 CM2/M2
ECHO AV MEAN GRADIENT: 3 MMHG
ECHO AV MEAN VELOCITY: 0.9 M/S
ECHO AV PEAK GRADIENT: 6 MMHG
ECHO AV PEAK VELOCITY: 1.2 M/S
ECHO AV VELOCITY RATIO: 0.83
ECHO AV VTI: 25.4 CM
ECHO BSA: 2.12 M2
ECHO EST RA PRESSURE: 3 MMHG
ECHO IVC INSP: 0.4 CM
ECHO IVC PROX: 1.6 CM
ECHO LA AREA 2C: 26.3 CM2
ECHO LA AREA 4C: 24.9 CM2
ECHO LA MAJOR AXIS: 6.3 CM
ECHO LA MINOR AXIS: 6.4 CM
ECHO LA VOL BP: 83 ML (ref 18–58)
ECHO LA VOL MOD A2C: 86 ML (ref 18–58)
ECHO LA VOL MOD A4C: 79 ML (ref 18–58)
ECHO LA VOL/BSA BIPLANE: 40 ML/M2 (ref 16–34)
ECHO LA VOLUME INDEX MOD A2C: 42 ML/M2 (ref 16–34)
ECHO LA VOLUME INDEX MOD A4C: 38 ML/M2 (ref 16–34)
ECHO LV E' LATERAL VELOCITY: 13 CM/S
ECHO LV E' SEPTAL VELOCITY: 9.3 CM/S
ECHO LV EDV A2C: 111 ML
ECHO LV EDV A4C: 109 ML
ECHO LV EDV INDEX A4C: 53 ML/M2
ECHO LV EDV NDEX A2C: 54 ML/M2
ECHO LV EF PHYSICIAN: 65 %
ECHO LV EJECTION FRACTION A2C: 73 %
ECHO LV EJECTION FRACTION A4C: 63 %
ECHO LV EJECTION FRACTION BIPLANE: 68 % (ref 55–100)
ECHO LV ESV A2C: 30 ML
ECHO LV ESV A4C: 41 ML
ECHO LV ESV INDEX A2C: 15 ML/M2
ECHO LV ESV INDEX A4C: 20 ML/M2
ECHO LV FRACTIONAL SHORTENING: 17 % (ref 28–44)
ECHO LV INTERNAL DIMENSION DIASTOLE INDEX: 2.33 CM/M2
ECHO LV INTERNAL DIMENSION DIASTOLIC: 4.8 CM (ref 4.2–5.9)
ECHO LV INTERNAL DIMENSION SYSTOLIC INDEX: 1.94 CM/M2
ECHO LV INTERNAL DIMENSION SYSTOLIC: 4 CM
ECHO LV IVSD: 1.2 CM (ref 0.6–1)
ECHO LV MASS 2D: 219.1 G (ref 88–224)
ECHO LV MASS INDEX 2D: 106.4 G/M2 (ref 49–115)
ECHO LV POSTERIOR WALL DIASTOLIC: 1.2 CM (ref 0.6–1)
ECHO LV RELATIVE WALL THICKNESS RATIO: 0.5
ECHO LVOT AREA: 3.1 CM2
ECHO LVOT AV VTI INDEX: 0.7
ECHO LVOT DIAM: 2 CM
ECHO LVOT MEAN GRADIENT: 2 MMHG
ECHO LVOT MEAN GRADIENT: 2 MMHG
ECHO LVOT PEAK GRADIENT: 4 MMHG
ECHO LVOT PEAK VELOCITY: 1 M/S
ECHO LVOT STROKE VOLUME INDEX: 27.1 ML/M2
ECHO LVOT SV: 55.9 ML
ECHO LVOT VTI: 17.8 CM
ECHO MV E VELOCITY: 1.02 M/S
ECHO MV E/E' LATERAL: 7.85
ECHO MV E/E' RATIO (AVERAGED): 9.41
ECHO MV E/E' SEPTAL: 10.97
ECHO PV MAX VELOCITY: 0.9 M/S
ECHO PV PEAK GRADIENT: 3 MMHG
ECHO RA AREA 4C: 13 CM2
ECHO RA END SYSTOLIC VOLUME APICAL 4 CHAMBER INDEX BSA: 12 ML/M2
ECHO RA VOLUME: 25 ML
ECHO RIGHT VENTRICULAR SYSTOLIC PRESSURE (RVSP): 22 MMHG
ECHO RV BASAL DIMENSION: 4.1 CM
ECHO RV FREE WALL PEAK S': 14.6 CM/S
ECHO RV LONGITUDINAL DIMENSION: 8.9 CM
ECHO RV MID DIMENSION: 2.7 CM
ECHO RV TAPSE: 1.9 CM (ref 1.7–?)
ECHO TV REGURGITANT MAX VELOCITY: 2.16 M/S
ECHO TV REGURGITANT PEAK GRADIENT: 19 MMHG
EKG DIAGNOSIS: NORMAL
EKG Q-T INTERVAL: 378 MS
EKG QRS DURATION: 98 MS
EKG QTC CALCULATION (BAZETT): 446 MS
EKG R AXIS: -38 DEGREES
EKG T AXIS: 59 DEGREES
EKG VENTRICULAR RATE: 84 BPM
TROPONIN, HIGH SENSITIVITY: 26 NG/L (ref 0–22)

## 2025-05-22 PROCEDURE — 93975 VASCULAR STUDY: CPT

## 2025-05-22 PROCEDURE — G0378 HOSPITAL OBSERVATION PER HR: HCPCS

## 2025-05-22 PROCEDURE — 6370000000 HC RX 637 (ALT 250 FOR IP)

## 2025-05-22 PROCEDURE — 6360000004 HC RX CONTRAST MEDICATION: Performed by: STUDENT IN AN ORGANIZED HEALTH CARE EDUCATION/TRAINING PROGRAM

## 2025-05-22 PROCEDURE — 6370000000 HC RX 637 (ALT 250 FOR IP): Performed by: INTERNAL MEDICINE

## 2025-05-22 PROCEDURE — 93306 TTE W/DOPPLER COMPLETE: CPT | Performed by: STUDENT IN AN ORGANIZED HEALTH CARE EDUCATION/TRAINING PROGRAM

## 2025-05-22 PROCEDURE — 6370000000 HC RX 637 (ALT 250 FOR IP): Performed by: STUDENT IN AN ORGANIZED HEALTH CARE EDUCATION/TRAINING PROGRAM

## 2025-05-22 PROCEDURE — 2580000003 HC RX 258: Performed by: INTERNAL MEDICINE

## 2025-05-22 PROCEDURE — 96361 HYDRATE IV INFUSION ADD-ON: CPT

## 2025-05-22 PROCEDURE — 93010 ELECTROCARDIOGRAM REPORT: CPT | Performed by: INTERNAL MEDICINE

## 2025-05-22 PROCEDURE — 96376 TX/PRO/DX INJ SAME DRUG ADON: CPT

## 2025-05-22 PROCEDURE — 96374 THER/PROPH/DIAG INJ IV PUSH: CPT

## 2025-05-22 PROCEDURE — 99222 1ST HOSP IP/OBS MODERATE 55: CPT | Performed by: STUDENT IN AN ORGANIZED HEALTH CARE EDUCATION/TRAINING PROGRAM

## 2025-05-22 PROCEDURE — 74183 MRI ABD W/O CNTR FLWD CNTR: CPT

## 2025-05-22 PROCEDURE — 96375 TX/PRO/DX INJ NEW DRUG ADDON: CPT

## 2025-05-22 PROCEDURE — C8929 TTE W OR WO FOL WCON,DOPPLER: HCPCS

## 2025-05-22 PROCEDURE — 6360000002 HC RX W HCPCS

## 2025-05-22 PROCEDURE — A9577 INJ MULTIHANCE: HCPCS | Performed by: STUDENT IN AN ORGANIZED HEALTH CARE EDUCATION/TRAINING PROGRAM

## 2025-05-22 PROCEDURE — 6360000004 HC RX CONTRAST MEDICATION

## 2025-05-22 PROCEDURE — 2500000003 HC RX 250 WO HCPCS

## 2025-05-22 PROCEDURE — 2580000003 HC RX 258

## 2025-05-22 RX ORDER — POTASSIUM CHLORIDE 7.45 MG/ML
10 INJECTION INTRAVENOUS PRN
Status: DISCONTINUED | OUTPATIENT
Start: 2025-05-22 | End: 2025-05-24 | Stop reason: HOSPADM

## 2025-05-22 RX ORDER — HYDROXYZINE HYDROCHLORIDE 25 MG/1
25 TABLET, FILM COATED ORAL EVERY 8 HOURS PRN
Status: DISCONTINUED | OUTPATIENT
Start: 2025-05-22 | End: 2025-05-23

## 2025-05-22 RX ORDER — ONDANSETRON 4 MG/1
4 TABLET, ORALLY DISINTEGRATING ORAL EVERY 8 HOURS PRN
Status: DISCONTINUED | OUTPATIENT
Start: 2025-05-22 | End: 2025-05-24 | Stop reason: HOSPADM

## 2025-05-22 RX ORDER — POTASSIUM CHLORIDE 1500 MG/1
40 TABLET, EXTENDED RELEASE ORAL PRN
Status: DISCONTINUED | OUTPATIENT
Start: 2025-05-22 | End: 2025-05-24 | Stop reason: HOSPADM

## 2025-05-22 RX ORDER — MAGNESIUM SULFATE IN WATER 40 MG/ML
2000 INJECTION, SOLUTION INTRAVENOUS PRN
Status: DISCONTINUED | OUTPATIENT
Start: 2025-05-22 | End: 2025-05-24 | Stop reason: HOSPADM

## 2025-05-22 RX ORDER — ACETAMINOPHEN 325 MG/1
650 TABLET ORAL EVERY 6 HOURS PRN
Status: DISCONTINUED | OUTPATIENT
Start: 2025-05-22 | End: 2025-05-24 | Stop reason: HOSPADM

## 2025-05-22 RX ORDER — CETIRIZINE HYDROCHLORIDE 10 MG/1
5 TABLET ORAL DAILY
Status: DISCONTINUED | OUTPATIENT
Start: 2025-05-22 | End: 2025-05-22

## 2025-05-22 RX ORDER — SODIUM CHLORIDE 0.9 % (FLUSH) 0.9 %
5-40 SYRINGE (ML) INJECTION EVERY 12 HOURS SCHEDULED
Status: DISCONTINUED | OUTPATIENT
Start: 2025-05-22 | End: 2025-05-24 | Stop reason: HOSPADM

## 2025-05-22 RX ORDER — SODIUM CHLORIDE 9 MG/ML
INJECTION, SOLUTION INTRAVENOUS PRN
Status: DISCONTINUED | OUTPATIENT
Start: 2025-05-22 | End: 2025-05-24 | Stop reason: HOSPADM

## 2025-05-22 RX ORDER — 0.9 % SODIUM CHLORIDE 0.9 %
250 INTRAVENOUS SOLUTION INTRAVENOUS ONCE
Status: COMPLETED | OUTPATIENT
Start: 2025-05-22 | End: 2025-05-23

## 2025-05-22 RX ORDER — OMEGA-3/DHA/EPA/FISH OIL 60 MG-90MG
500 CAPSULE ORAL
Status: DISCONTINUED | OUTPATIENT
Start: 2025-05-22 | End: 2025-05-22 | Stop reason: RX

## 2025-05-22 RX ORDER — 0.9 % SODIUM CHLORIDE 0.9 %
250 INTRAVENOUS SOLUTION INTRAVENOUS ONCE
Status: DISCONTINUED | OUTPATIENT
Start: 2025-05-22 | End: 2025-05-22

## 2025-05-22 RX ORDER — MIDODRINE HYDROCHLORIDE 5 MG/1
10 TABLET ORAL ONCE
Status: COMPLETED | OUTPATIENT
Start: 2025-05-22 | End: 2025-05-22

## 2025-05-22 RX ORDER — ACETAMINOPHEN 650 MG/1
650 SUPPOSITORY RECTAL EVERY 6 HOURS PRN
Status: DISCONTINUED | OUTPATIENT
Start: 2025-05-22 | End: 2025-05-24 | Stop reason: HOSPADM

## 2025-05-22 RX ORDER — CARVEDILOL 6.25 MG/1
6.25 TABLET ORAL 2 TIMES DAILY WITH MEALS
Status: DISCONTINUED | OUTPATIENT
Start: 2025-05-22 | End: 2025-05-24 | Stop reason: HOSPADM

## 2025-05-22 RX ORDER — DILTIAZEM HYDROCHLORIDE 180 MG/1
180 CAPSULE, COATED, EXTENDED RELEASE ORAL DAILY
Status: DISCONTINUED | OUTPATIENT
Start: 2025-05-22 | End: 2025-05-22

## 2025-05-22 RX ORDER — M-VIT,TX,IRON,MINS/CALC/FOLIC 27MG-0.4MG
1 TABLET ORAL DAILY
Status: DISCONTINUED | OUTPATIENT
Start: 2025-05-22 | End: 2025-05-24 | Stop reason: HOSPADM

## 2025-05-22 RX ORDER — POLYETHYLENE GLYCOL 3350 17 G/17G
17 POWDER, FOR SOLUTION ORAL DAILY PRN
Status: DISCONTINUED | OUTPATIENT
Start: 2025-05-22 | End: 2025-05-24 | Stop reason: HOSPADM

## 2025-05-22 RX ORDER — ATORVASTATIN CALCIUM 10 MG/1
10 TABLET, FILM COATED ORAL DAILY
Status: DISCONTINUED | OUTPATIENT
Start: 2025-05-22 | End: 2025-05-24 | Stop reason: HOSPADM

## 2025-05-22 RX ORDER — SODIUM CHLORIDE 0.9 % (FLUSH) 0.9 %
5-40 SYRINGE (ML) INJECTION PRN
Status: DISCONTINUED | OUTPATIENT
Start: 2025-05-22 | End: 2025-05-24 | Stop reason: HOSPADM

## 2025-05-22 RX ORDER — AMLODIPINE BESYLATE 5 MG/1
5 TABLET ORAL DAILY
Status: DISCONTINUED | OUTPATIENT
Start: 2025-05-22 | End: 2025-05-23

## 2025-05-22 RX ORDER — ONDANSETRON 2 MG/ML
4 INJECTION INTRAMUSCULAR; INTRAVENOUS EVERY 6 HOURS PRN
Status: DISCONTINUED | OUTPATIENT
Start: 2025-05-22 | End: 2025-05-24 | Stop reason: HOSPADM

## 2025-05-22 RX ORDER — HYDRALAZINE HYDROCHLORIDE 20 MG/ML
10 INJECTION INTRAMUSCULAR; INTRAVENOUS EVERY 4 HOURS PRN
Status: DISCONTINUED | OUTPATIENT
Start: 2025-05-22 | End: 2025-05-22

## 2025-05-22 RX ADMIN — ATORVASTATIN CALCIUM 10 MG: 10 TABLET, FILM COATED ORAL at 09:35

## 2025-05-22 RX ADMIN — ACETAMINOPHEN 650 MG: 325 TABLET ORAL at 10:38

## 2025-05-22 RX ADMIN — CARVEDILOL 6.25 MG: 6.25 TABLET, FILM COATED ORAL at 16:39

## 2025-05-22 RX ADMIN — HYDROXYZINE HYDROCHLORIDE 25 MG: 25 TABLET, FILM COATED ORAL at 03:55

## 2025-05-22 RX ADMIN — APIXABAN 5 MG: 5 TABLET, FILM COATED ORAL at 21:49

## 2025-05-22 RX ADMIN — LORAZEPAM 1 MG: 2 INJECTION INTRAMUSCULAR; INTRAVENOUS at 04:10

## 2025-05-22 RX ADMIN — SODIUM CHLORIDE 250 ML: 0.9 INJECTION, SOLUTION INTRAVENOUS at 18:29

## 2025-05-22 RX ADMIN — CARVEDILOL 6.25 MG: 6.25 TABLET, FILM COATED ORAL at 09:35

## 2025-05-22 RX ADMIN — APIXABAN 5 MG: 5 TABLET, FILM COATED ORAL at 01:28

## 2025-05-22 RX ADMIN — GADOBENATE DIMEGLUMINE 18 ML: 529 INJECTION, SOLUTION INTRAVENOUS at 16:21

## 2025-05-22 RX ADMIN — HYDRALAZINE HYDROCHLORIDE 10 MG: 20 INJECTION INTRAMUSCULAR; INTRAVENOUS at 15:38

## 2025-05-22 RX ADMIN — DILTIAZEM HYDROCHLORIDE 180 MG: 180 CAPSULE, COATED, EXTENDED RELEASE ORAL at 09:35

## 2025-05-22 RX ADMIN — MIDODRINE HYDROCHLORIDE 10 MG: 5 TABLET ORAL at 18:30

## 2025-05-22 RX ADMIN — HYDRALAZINE HYDROCHLORIDE 10 MG: 20 INJECTION INTRAMUSCULAR; INTRAVENOUS at 03:38

## 2025-05-22 RX ADMIN — SERTRALINE 100 MG: 50 TABLET, FILM COATED ORAL at 09:35

## 2025-05-22 RX ADMIN — SULFUR HEXAFLUORIDE 2 ML: 60.7; .19; .19 INJECTION, POWDER, LYOPHILIZED, FOR SUSPENSION INTRAVENOUS; INTRAVESICAL at 08:21

## 2025-05-22 RX ADMIN — Medication 1 TABLET: at 09:35

## 2025-05-22 RX ADMIN — MELATONIN TAB 3 MG 3 MG: 3 TAB at 21:46

## 2025-05-22 RX ADMIN — SODIUM CHLORIDE, PRESERVATIVE FREE 10 ML: 5 INJECTION INTRAVENOUS at 20:21

## 2025-05-22 RX ADMIN — AMLODIPINE BESYLATE 5 MG: 5 TABLET ORAL at 09:35

## 2025-05-22 RX ADMIN — HYDROXYZINE HYDROCHLORIDE 25 MG: 25 TABLET, FILM COATED ORAL at 09:35

## 2025-05-22 ASSESSMENT — ENCOUNTER SYMPTOMS
PHOTOPHOBIA: 0
COUGH: 0
SHORTNESS OF BREATH: 0
CHEST TIGHTNESS: 0
ABDOMINAL DISTENTION: 0
NAUSEA: 0
BACK PAIN: 0
VOMITING: 0
CONSTIPATION: 0
ABDOMINAL PAIN: 0
RESPIRATORY NEGATIVE: 1
COLOR CHANGE: 0
DIARRHEA: 0

## 2025-05-22 ASSESSMENT — PAIN DESCRIPTION - LOCATION: LOCATION: HEAD

## 2025-05-22 ASSESSMENT — PAIN SCALES - GENERAL
PAINLEVEL_OUTOF10: 2
PAINLEVEL_OUTOF10: 0
PAINLEVEL_OUTOF10: 0

## 2025-05-22 ASSESSMENT — PAIN DESCRIPTION - DESCRIPTORS: DESCRIPTORS: ACHING

## 2025-05-22 NOTE — ED NOTES
Patient Name: Liu Staples  : 1940 84 y.o.  MRN: [id number]  ED Room #: ED-0007/07     Chief complaint:   Chief Complaint   Patient presents with    Hypertension     Pt w c/o HTN at (179/118 at home)     Hospital Problem/Diagnosis:   Hospital Problems           Last Modified POA    * (Principal) Hypertensive emergency 2025 Yes         O2 Flow Rate:O2 Device: None (Room air)   (if applicable)  Cardiac Rhythm:   (if applicable)  Active LDA's:   Peripheral IV 25 Left Antecubital (Active)            How does patient ambulate? Low Fall Risk (Ambulates by themselves without support    2. How does patient take pills? Whole with Water    3. Is patient alert? Alert    4. Is patient oriented? To Person, To Place, To Time, and To Situation    5.   Patient arrived from:  home  Facility Name: ___________________________________________    6. If patient is disoriented or from a Skill Nursing Facility has family been notified of admission? No    7. Patient belongings? Belongings: Cell Phone and Clothing    Disposition of belongings? Kept with Patient     8. Any specific patient or family belongings/needs/dynamics?   a. N/a    9. Miscellaneous comments/pending orders?  a. N/a      If there are any additional questions please reach out to the Emergency Department.

## 2025-05-22 NOTE — PLAN OF CARE
Problem: Discharge Planning  Goal: Discharge to home or other facility with appropriate resources  Outcome: Progressing  Flowsheets (Taken 5/22/2025 0444 by Aureliano Lopez RN)  Discharge to home or other facility with appropriate resources: Identify barriers to discharge with patient and caregiver     Problem: ABCDS Injury Assessment  Goal: Absence of physical injury  Outcome: Progressing     Problem: Safety - Adult  Goal: Free from fall injury  Outcome: Progressing     Problem: Pain  Goal: Verbalizes/displays adequate comfort level or baseline comfort level  Outcome: Progressing     Problem: Respiratory - Adult  Goal: Achieves optimal ventilation and oxygenation  Outcome: Progressing     Problem: Cardiovascular - Adult  Goal: Maintains optimal cardiac output and hemodynamic stability  Outcome: Progressing     Problem: Skin/Tissue Integrity - Adult  Goal: Incisions, wounds, or drain sites healing without S/S of infection  Outcome: Progressing     Problem: Metabolic/Fluid and Electrolytes - Adult  Goal: Electrolytes maintained within normal limits  Outcome: Progressing

## 2025-05-22 NOTE — PROGRESS NOTES
Patient seen earlier today by my colleague. Full progress note to follow tomorrow.     In brief, patient admitted for hypertension.      ASSESSMENT:    Active Hospital Problems    Diagnosis     Hypertensive emergency [I16.1]     GONZALO (acute kidney injury) [N17.9]     Atrial fibrillation (HCC) [I48.91]     High cholesterol [E78.00]     HTN (hypertension) [I10]          PLAN:    -Off cardene drip  -cardiology consulted  -continue home antihypertensives  -Okay to transfer out of ICU        Po Reid DO  5/22/2025  10:11 AM

## 2025-05-22 NOTE — H&P
V2.0  History and Physical      Name:  Liu Staples /Age/Sex: 1940  (84 y.o. male)   MRN & CSN:  [id number] & [id number] Encounter Date/Time: 2025 1:51 AM EDT   Location:  ED PCP: No primary care provider on file.       Hospital Day: 2    Assessment and Plan:   Liu Staples is a 84 y.o. male with a pmh of hypertension A-fib on Eliquis, CAD, DVT, anxiety and depression, who presents with Hypertensive emergency with endorgan damage    Hospital Problems           Last Modified POA    * (Principal) Hypertensive emergency 2025 Yes       Plan:  #Hypertensive emergency with endorgan damage  #Acute kidney injury,   # Elevated BNP  Blood pressure above 190/100, heart rate ranging from 90s to 100  On  creatinine 0.9 and EGFR 83, increased creatinine 1.4, BUN 23 and On   increased 1179, troponin x 226  - CTA of chest abdominal pelvic: No PE or pleural effusion or pulmonary edema.  - EKG A-fib with a rate 84.  No ST or T wave changes  - Echocardiogram in  left ventricular function within normal limit with EF of 50 to 55%  - Patient was given multiple dose of hydralazine still his bedside blood pressure has been elevated  - At home patient takes diltiazem which was increased recently to 180 mg daily  - Will start patient on nicardipine drip with a goal 140-160 systolic  - Will transfer patient to ICU  - Will consult to cardiologist    #A-fib  - On diltiazem 180 mg daily and Eliquis will continue    #Aortic aneurysm without rupture  - CT of the chest abdominal pelvic: Ascending aorta is enlarged measuring 4 cm in diameter.  Atherosclerotic vascular disease and coronary artery disease.    #Pancreatic mass  - CT of abdominal pelvic: Pancreatic tail cyst, with differential favoring benign cystic lesion. Neoplasm not entirely excluded.  - Will obtain MRI    #Liver lesion noted on the CT of abdomen pelvis. Pancreatic tail cyst, with differential favoring benign cystic lesion. Neoplasm

## 2025-05-22 NOTE — ED PROVIDER NOTES
I have personally performed a face to face diagnostic evaluation on this patient. I have fully participated in the care of this patient I personally saw the patient and performed a substantive portion of the visit including all aspects of the medical decision making.  I have reviewed and agree with all pertinent clinical information including history, physical exam, diagnostic tests, and the plan.    Medical Decision Making  Patient presents with elevated blood pressure reading at home.  He notes blood pressure of 190 systolic while at home tonight.  He is not having chest pain or chest tightness with this.  Not dizzy but feels generally weak.  Pt seen here for elevated blood pressure reading and had cardizem dose increased 2 days prior.  Workup at that time suggested no signs of end organ damage.  Is very anxious, checking blood pressure frequently. He is also in the process of moving houses and has not been sleeping well.    On exam pt is resting comfortably  no increased work of breathing  Speech is clear no facial drooping.  Answers all questions appropriately.  Moves all 4 extremities to command.  There is no truncal ataxia or gait instability.    Though he had normal blood work 3 days ago today he has developed small GONZALO.  Blood pressure remains elevated here in the 170s over 90s even after recently increased dose of diltiazem.  This may be stress or anxiety related as well.  He does not have any primary care follow-up until July, his primary care doctor recently retired.  I discussed most Soble incidental findings with the patient including a aortic aneurysm of 4 cm, liver and pancreatic mass that will need outpatient follow-up.  With GONZALO today and continued hypertension, second visit I would recommend admission at this point for blood pressure control and repeat renal function.  His high-sensitivity opponent is mildly elevated from 2 days ago however he is chest pain-free with negative delta and nonischemic  His high-sensitivity opponent is mildly elevated from 2 days ago however he is chest pain-free with negative delta and nonischemic EKG.  He is agreeable to plan.    EKG  The Ekg interpreted by me in the absence of a cardiologist shows.  Atrial fibrillation with a ventricular rate of 84.  Left axis deviation.  QTc appropriate.  No specific ST or T wave abnormality.  No significant change from prior 5-.        SEP-1  Is this patient to be included in the SEP-1 Core Measure due to severe sepsis or septic shock?   No     Exclusion criteria - the patient is NOT to be included for SEP-1 Core Measure due to:  Infection is not suspected    Screenings     [place] Coma Scale  Eye Opening: Spontaneous  Best Verbal Response: Oriented  Best Motor Response: Obeys commands  Michael Coma Scale Score: 15        CTA CHEST ABDOMEN PELVIS W CONTRAST   Final Result   1. No acute vascular injury. No acute findings of the chest, abdomen, or   pelvis.   2. Ascending aorta is enlarged measuring 4 cm in diameter.  Atherosclerotic   vascular disease and coronary artery disease.   3. Enhancing 1 cm lesion in the liver, which may represent a hemangioma.   Recommend further evaluation with routine multiphase CT or MRI for further   characterization.   4. Pancreatic tail cyst, with differential favoring benign cystic lesion.   Neoplasm not entirely excluded.  Recommend further evaluation with routine   multiphase CT or MRI for further characterization.           Labs Reviewed   CBC WITH AUTO DIFFERENTIAL - Abnormal; Notable for the following components:       Result Value    Hemoglobin 13.3 (*)     Hematocrit 38.5 (*)     All other components within normal limits   COMPREHENSIVE METABOLIC PANEL W/ REFLEX TO MG FOR LOW K - Abnormal; Notable for the following components:    Glucose 106 (*)     BUN 23 (*)     Creatinine 1.4 (*)     Est, Glom Filt Rate 49 (*)     Total Protein 6.0 (*)     Total Bilirubin 1.2 (*)     Alkaline Phosphatase 29 (*)

## 2025-05-22 NOTE — ED PROVIDER NOTES
[facility] EMERGENCY DEPARTMENT  EMERGENCY DEPARTMENT ENCOUNTER        Pt Name: Liu Staples  MRN: [id number]  Birthdate 1940  Date of evaluation: 5/21/2025  Provider: SHANTE Barker  PCP: No primary care provider on file.  Note Started: 12:46 AM EDT 5/22/25       I have seen and evaluated this patient with my supervising physician Scarlett Arias MD.      CHIEF COMPLAINT       Chief Complaint   Patient presents with    Hypertension     Pt w c/o HTN at (179/118 at home)       HISTORY OF PRESENT ILLNESS: 1 or more Elements     History From: Patient  Limitations to history : None    Liu Staples is a 84 y.o. male with past medical history of hypertension, hyperlipidemia, depression, atrial fibrillation who presents ED with complaint of elevated blood pressure.  Patient reports he has been under some increasing stress at home.  He reports he been checking his blood pressure and blood pressure has been elevated around 170-190 systolic.  Was seen here in the emerge department couple days ago.  Reports he had his blood pressure medication increased and was placed on Vistaril for anxiety.  Reports continued elevated blood pressure.  Became concerned and came to the ED for further evaluation treatment.  No chest pain or chest tightness.  No shortness of breath.  No weakness in the arms or legs.  No headache, lightheadedness/dizziness, syncope or near syncope.  No abdominal pain, nausea/vomiting, urinary symptoms or changes in bowel movements.  No orthopnea, pedal edema or calf tenderness.    Nursing Notes were all reviewed and agreed with or any disagreements were addressed in the HPI.    REVIEW OF SYSTEMS :      Review of Systems   Constitutional:  Negative for activity change, appetite change, chills, diaphoresis, fatigue and fever.   Eyes:  Negative for photophobia and visual disturbance.   Respiratory: Negative.  Negative for cough, chest tightness and shortness of breath.    Cardiovascular:  blood pressure of 151/69.  Left upper extremity with blood pressure 136/83.  Given the reported differences in blood pressure to the upper extremities I did order CTA of the chest.  CBC with normal white count and platelets.  Hemoglobin 13.3.  CMP with creatinine 1.4 otherwise relatively unremarkable.  Troponin is 26.  Delta troponin 26 and no significant delta change.  BNP 1079.  Coags obtained.  CT of the chest obtained and showed no acute vascular injury.  No acute findings in the chest abdomen pelvis.  Ascending aorta is slightly larger at 4 cm.  Atherosclerotic vascular disease and coronary disease noted.  Liver lesion incidentally noted.  Pancreatic tail cyst noted.  Patient pending repeat.  Will sign out to attending.  Please see attending provider note for further disposition and treatment of patient given end of shift.       I am the Primary Clinician of Record.  FINAL IMPRESSION      1. Secondary hypertension    2. Aortic aneurysm without rupture, unspecified portion of aorta    3. Pancreatic mass    4. Liver mass    5. Coronary atherosclerosis due to lipid rich plaque          DISPOSITION/PLAN     DISPOSITION Discharge - Pending Orders Complete 05/22/2025 12:35:42 AM   DISPOSITION CONDITION Stable           PATIENT REFERRED TO:  No follow-up provider specified.    DISCHARGE MEDICATIONS:  New Prescriptions    No medications on file       DISCONTINUED MEDICATIONS:  Discontinued Medications    No medications on file              (Please note that portions of this note were completed with a voice recognition program.  Efforts were made to edit the dictations but occasionally words are mis-transcribed.)    SHANTE Barker (electronically signed)       Mitchell Olivier PA  05/22/25 0049

## 2025-05-22 NOTE — CONSULTS
[facility] HEART [place]    2025    Liu Staples (:  1940) is a 84 y.o. male    Referring Provider: No primary care provider on file.    HISTORY:  Mr. Staples is an 84 y.o. male with pmh of afib, HTN, HLD, nonobstructive CAD, DVT who presented to the emergency department with complaints of HTN. He noted SBP in 190s at home last night. His diltiazem was increased on  after a visit with NP Cha Goode regarding his afib. He denies chest pain or tightness. Denies SOB. Denies dizziness but does endorse weakness. He admits to increased life stressors recently with trying to downsize to a new home. He states he only came in because of his blood pressure, not really with any symptoms. He and his wife are quite concerned about what stress will do again to him when he leaves. A fib is rate controlled this AM. Normotensive not on IV Rx.     REVIEW OF SYSTEMS:  A complete review of systems was reviewed and is negative except as noted in the history of present illness.    Prior to Visit Medications    Medication Sig Taking? Authorizing Provider   dilTIAZem (CARDIZEM CD) 180 MG extended release capsule Take 1 capsule by mouth daily Yes John Steinberg, PA-C   hydrOXYzine HCl (ATARAX) 25 MG tablet Take 1 tablet by mouth every 8 hours as needed for Itching Yes Shawn Romero MD   melatonin 3 MG TABS tablet Take 1 tablet by mouth nightly as needed (trouble sleeping) Yes Shawn Romero MD   sertraline (ZOLOFT) 100 MG tablet Take 1 tablet by mouth daily Yes Linda Fowler MD   atorvastatin (LIPITOR) 10 MG tablet Take 1 tablet by mouth daily Yes Cha Goode APRN - CNP   apixaban (ELIQUIS) 5 MG TABS tablet Take 1 tablet by mouth 2 times daily Yes Farhad Vega APRN - CNP   Omega-3 Fatty Acids (FISH OIL) 500 MG CAPS Take 500 mg by mouth daily (with breakfast) Yes Linda Fowler MD   Vitamin Mixture (VITAMIN E COMPLETE) CAPS Take by mouth Yes Linda Fowler MD   Multiple Vitamins-Minerals

## 2025-05-22 NOTE — PROGRESS NOTES
Patient complained of dizziness. Orthostatics were positive. On call Alejandra ACOSTA, messaged about patients BP at 1803. See orders placed at 1809. After received midodrine and bolus BP responded and is now 118/75 at 1846.

## 2025-05-23 LAB
ALBUMIN SERPL-MCNC: 3.8 G/DL (ref 3.4–5)
ALBUMIN/GLOB SERPL: 1.9 {RATIO} (ref 1.1–2.2)
ALP SERPL-CCNC: 28 U/L (ref 40–129)
ALT SERPL-CCNC: 14 U/L (ref 10–40)
ANION GAP SERPL CALCULATED.3IONS-SCNC: 10 MMOL/L (ref 3–16)
AST SERPL-CCNC: 20 U/L (ref 15–37)
BASOPHILS # BLD: 0 K/UL (ref 0–0.2)
BASOPHILS NFR BLD: 0.6 %
BILIRUB SERPL-MCNC: 1.2 MG/DL (ref 0–1)
BUN SERPL-MCNC: 23 MG/DL (ref 7–20)
CALCIUM SERPL-MCNC: 9 MG/DL (ref 8.3–10.6)
CHLORIDE SERPL-SCNC: 104 MMOL/L (ref 99–110)
CO2 SERPL-SCNC: 23 MMOL/L (ref 21–32)
CREAT SERPL-MCNC: 0.9 MG/DL (ref 0.8–1.3)
DEPRECATED RDW RBC AUTO: 13.7 % (ref 12.4–15.4)
EOSINOPHIL # BLD: 0.2 K/UL (ref 0–0.6)
EOSINOPHIL NFR BLD: 2.4 %
GFR SERPLBLD CREATININE-BSD FMLA CKD-EPI: 83 ML/MIN/{1.73_M2}
GLUCOSE SERPL-MCNC: 110 MG/DL (ref 70–99)
HCT VFR BLD AUTO: 39.2 % (ref 40.5–52.5)
HGB BLD-MCNC: 13.6 G/DL (ref 13.5–17.5)
LYMPHOCYTES # BLD: 1.4 K/UL (ref 1–5.1)
LYMPHOCYTES NFR BLD: 20.9 %
MCH RBC QN AUTO: 31.7 PG (ref 26–34)
MCHC RBC AUTO-ENTMCNC: 34.7 G/DL (ref 31–36)
MCV RBC AUTO: 91.3 FL (ref 80–100)
MONOCYTES # BLD: 0.6 K/UL (ref 0–1.3)
MONOCYTES NFR BLD: 9.7 %
NEUTROPHILS # BLD: 4.3 K/UL (ref 1.7–7.7)
NEUTROPHILS NFR BLD: 66.4 %
PLATELET # BLD AUTO: 161 K/UL (ref 135–450)
PMV BLD AUTO: 8 FL (ref 5–10.5)
POTASSIUM SERPL-SCNC: 4.2 MMOL/L (ref 3.5–5.1)
PROT SERPL-MCNC: 5.8 G/DL (ref 6.4–8.2)
RBC # BLD AUTO: 4.3 M/UL (ref 4.2–5.9)
SODIUM SERPL-SCNC: 137 MMOL/L (ref 136–145)
WBC # BLD AUTO: 6.5 K/UL (ref 4–11)

## 2025-05-23 PROCEDURE — 6370000000 HC RX 637 (ALT 250 FOR IP): Performed by: STUDENT IN AN ORGANIZED HEALTH CARE EDUCATION/TRAINING PROGRAM

## 2025-05-23 PROCEDURE — 6370000000 HC RX 637 (ALT 250 FOR IP)

## 2025-05-23 PROCEDURE — G0378 HOSPITAL OBSERVATION PER HR: HCPCS

## 2025-05-23 PROCEDURE — 96361 HYDRATE IV INFUSION ADD-ON: CPT

## 2025-05-23 PROCEDURE — 80053 COMPREHEN METABOLIC PANEL: CPT

## 2025-05-23 PROCEDURE — 36415 COLL VENOUS BLD VENIPUNCTURE: CPT

## 2025-05-23 PROCEDURE — 85025 COMPLETE CBC W/AUTO DIFF WBC: CPT

## 2025-05-23 PROCEDURE — 2500000003 HC RX 250 WO HCPCS

## 2025-05-23 RX ORDER — CARVEDILOL 6.25 MG/1
6.25 TABLET ORAL 2 TIMES DAILY WITH MEALS
Qty: 60 TABLET | Refills: 3 | Status: SHIPPED | OUTPATIENT
Start: 2025-05-23

## 2025-05-23 RX ORDER — CLONIDINE HYDROCHLORIDE 0.1 MG/1
0.1 TABLET ORAL 3 TIMES DAILY PRN
Status: DISCONTINUED | OUTPATIENT
Start: 2025-05-23 | End: 2025-05-24 | Stop reason: HOSPADM

## 2025-05-23 RX ORDER — HYDROXYZINE HYDROCHLORIDE 50 MG/1
100 TABLET, FILM COATED ORAL EVERY 8 HOURS PRN
Qty: 90 TABLET | Refills: 1 | Status: SHIPPED | OUTPATIENT
Start: 2025-05-23 | End: 2025-05-23

## 2025-05-23 RX ORDER — HYDROXYZINE HYDROCHLORIDE 25 MG/1
50 TABLET, FILM COATED ORAL EVERY 8 HOURS PRN
Status: DISCONTINUED | OUTPATIENT
Start: 2025-05-23 | End: 2025-05-24 | Stop reason: HOSPADM

## 2025-05-23 RX ORDER — CLONIDINE HYDROCHLORIDE 0.1 MG/1
0.1 TABLET ORAL 3 TIMES DAILY PRN
Qty: 60 TABLET | Refills: 3 | Status: SHIPPED | OUTPATIENT
Start: 2025-05-23

## 2025-05-23 RX ORDER — HYDROXYZINE HYDROCHLORIDE 25 MG/1
25 TABLET, FILM COATED ORAL EVERY 8 HOURS PRN
Status: DISCONTINUED | OUTPATIENT
Start: 2025-05-23 | End: 2025-05-23

## 2025-05-23 RX ORDER — HYDROXYZINE HYDROCHLORIDE 50 MG/1
50 TABLET, FILM COATED ORAL EVERY 8 HOURS PRN
Qty: 90 TABLET | Refills: 1 | Status: SHIPPED | OUTPATIENT
Start: 2025-05-23

## 2025-05-23 RX ADMIN — MELATONIN TAB 3 MG 3 MG: 3 TAB at 21:32

## 2025-05-23 RX ADMIN — HYDROXYZINE HYDROCHLORIDE 50 MG: 25 TABLET, FILM COATED ORAL at 13:40

## 2025-05-23 RX ADMIN — CARVEDILOL 6.25 MG: 6.25 TABLET, FILM COATED ORAL at 09:37

## 2025-05-23 RX ADMIN — Medication 1 TABLET: at 09:37

## 2025-05-23 RX ADMIN — SODIUM CHLORIDE, PRESERVATIVE FREE 10 ML: 5 INJECTION INTRAVENOUS at 09:36

## 2025-05-23 RX ADMIN — APIXABAN 5 MG: 5 TABLET, FILM COATED ORAL at 09:37

## 2025-05-23 RX ADMIN — ATORVASTATIN CALCIUM 10 MG: 10 TABLET, FILM COATED ORAL at 09:37

## 2025-05-23 RX ADMIN — APIXABAN 5 MG: 5 TABLET, FILM COATED ORAL at 21:32

## 2025-05-23 RX ADMIN — HYDROXYZINE HYDROCHLORIDE 50 MG: 25 TABLET, FILM COATED ORAL at 21:32

## 2025-05-23 RX ADMIN — SERTRALINE 100 MG: 50 TABLET, FILM COATED ORAL at 09:37

## 2025-05-23 RX ADMIN — HYDROXYZINE HYDROCHLORIDE 25 MG: 25 TABLET, FILM COATED ORAL at 06:37

## 2025-05-23 RX ADMIN — CLONIDINE HYDROCHLORIDE 0.1 MG: 0.1 TABLET ORAL at 21:32

## 2025-05-23 RX ADMIN — CARVEDILOL 6.25 MG: 6.25 TABLET, FILM COATED ORAL at 17:05

## 2025-05-23 NOTE — PROGRESS NOTES
CLINICAL PHARMACY NOTE: MEDS TO BEDS    Total # of Prescriptions Filled: 3   The following medications were delivered to the patient:  CLONIDINE HCL 0.1MG  CARVEDILOL 6.25MG  HYDROXYZINE HCL 50MG    Additional Documentation:  Delivered to patients room   Ok to be delivered per RN Cherri Kim CPhT

## 2025-05-23 NOTE — DISCHARGE SUMMARY
Hospital Medicine Discharge Summary    Name:  Liu Staples  Gender: male  : 1940  84 y.o.  MRN: [id number]    PCP: No primary care provider on file.     Date of Admission:  2025  9:41 PM  Discharge Date: 2025    Admitting Physician: Maci Aguila MD  Discharge Physician: Po Reid DO    Communication to PCP  - Stop Cardizem  - Start Coreg 6.25 mg twice daily  - Clonidine 0.1 mg 3 times daily as needed for SBP greater than 160  - Vistaril 100 mg 3 times daily as needed for anxiety; may need Zoloft increased or additional SSRI      Discharge Diagnoses:       Active Hospital Problems    Diagnosis     Hypertensive emergency [I16.1]     GONZALO (acute kidney injury) [N17.9]     Atrial fibrillation (HCC) [I48.91]     High cholesterol [E78.00]     HTN (hypertension) [I10]        The patient was seen and examined on day of discharge and this discharge summary is in conjunction with any daily progress note from day of discharge.    Hospital Course:  Liu Staples is a 84 y.o. year old male who presented to [facility] on 2025  9:41 PM.      Patient admitted for hypertension.  Was started on a Cardene drip, but quickly weaned off.  Cardiology was consulted.    Cardiology discontinued patient's home diltiazem and switch patient to Coreg and amlodipine for A-fib and hypertension control.  On , patient did have a drop in his blood pressure and amlodipine was held.  Patient's blood pressure recovered and was tolerating Coreg.  Amlodipine will not be prescribed on discharge.    Patient did have a pancreatic mass and liver lesions noted on CT abdomen.  MRI abdomen showed masses, but no emergent need for surgical intervention at this time and can follow-up outpatient with GI if needed.    Patient was also found to have 4 cm AAA on CT abdomen.  Patient will follow-up outpatient with his PCP regarding this.    Orthostatic blood pressures were measured on  prior to discharge and were

## 2025-05-23 NOTE — CARE COORDINATION
Discharge Planning Note:    Chart reviewed and it appears that patient has minimal needs for discharge at this time. Risk Score 13 %     Primary Care Physician is No primary care provider on file.    Primary insurance is [facility] Medicare    Please notify case management if any discharge needs are identified.      Case management will continue to follow progress and update discharge plan as needed.     Electronically signed by JEFF Bettencourt on 5/23/25 at 11:53 AM EDT

## 2025-05-23 NOTE — PLAN OF CARE
Problem: Discharge Planning  Goal: Discharge to home or other facility with appropriate resources  5/23/2025 1307 by Cherri Barrientos RN  Outcome: Adequate for Discharge  5/23/2025 1307 by Cherri Barrientos RN  Outcome: Progressing  5/23/2025 1306 by Cherri Barrientos RN  Outcome: Progressing     Problem: ABCDS Injury Assessment  Goal: Absence of physical injury  5/23/2025 1307 by Cherri Barrientos RN  Outcome: Adequate for Discharge  5/23/2025 1307 by Cherri Barrientos RN  Outcome: Progressing  5/23/2025 1306 by Cherri Barrientos RN  Outcome: Progressing     Problem: Safety - Adult  Goal: Free from fall injury  5/23/2025 1307 by Cherri Barrientos RN  Outcome: Adequate for Discharge  5/23/2025 1307 by Cherri Barrientos RN  Outcome: Progressing  5/23/2025 1306 by Cherri Barrientos RN  Outcome: Progressing     Problem: Pain  Goal: Verbalizes/displays adequate comfort level or baseline comfort level  5/23/2025 1307 by Cherri Barrientos RN  Outcome: Adequate for Discharge  5/23/2025 1307 by Cherri Barrientos RN  Outcome: Progressing  5/23/2025 1306 by Cherri Barrientos RN  Outcome: Progressing     Problem: Respiratory - Adult  Goal: Achieves optimal ventilation and oxygenation  5/23/2025 1307 by Cherri Barrientos RN  Outcome: Adequate for Discharge  5/23/2025 1307 by Cherri Barrientos RN  Outcome: Progressing  5/23/2025 1306 by Cherri Barrientos RN  Outcome: Progressing     Problem: Cardiovascular - Adult  Goal: Maintains optimal cardiac output and hemodynamic stability  5/23/2025 1307 by Cherri Barrientos RN  Outcome: Adequate for Discharge  5/23/2025 1307 by Cherri Barrientos RN  Outcome: Progressing  5/23/2025 1306 by Cherri Barrientos RN  Outcome: Progressing     Problem: Skin/Tissue Integrity - Adult  Goal: Incisions, wounds, or drain sites healing without S/S of infection  5/23/2025 1307 by Cherri Barrientos RN  Outcome: Adequate for Discharge  5/23/2025 1307 by Cherri Barrientos RN  Outcome: Progressing  5/23/2025 1306 by Cherri Barrientos, RN  Outcome: Progressing     Problem: Metabolic/Fluid and Electrolytes -

## 2025-05-23 NOTE — DISCHARGE INSTRUCTIONS
Take-Home Instructions   for Liu Staples:      5/23/2025     Special instructions:    Stop taking cardizem (diltiazem) for you atrial fibrillation  Start taking coreg (carvedilol) 6.25mg two times a day for atrial fibrillation and high blood pressure  Check your blood pressure 1-2 times per day. If you systolic blood pressure (top number) is above 160, take 0.1 mg clonidine pill. You can take clonidine up to 3 times a day as needed. When you take clonidine, re-check your blood pressure 1-2 hours after taking clonidine.    Follow up appointments:  No follow-up provider specified.    It was a pleasure caring for your medical needs at [facility] throughout your stay. Your healthcare and well being is our top priority, however it is a team effort between both your physicians and you as the patient.  Please take time to read over your take home instructions and recommendations to ensure you have the healthiest life possible going forward.      Important Things to Remember:  Please bring a current list of your medications to any future outpatient medical appointments or hospitalizations.    Reasons to call your primary care physician before your next routine follow up visit:  Temperature greater than 100.4 F  Persistent nausea and vomiting  Severe uncontrolled pain  Redness, tenderness, or signs of infection (pain, swelling, redness, odor or green/yellow discharge around skin)  Difficulty breathing, headache, or visual disturbances  Hives  Persistent dizziness or light-headedness  Extreme fatigue  Serious questions or concerns you may have after hospital discharge        HEALTH MAINTENANCE RECOMMENDATIONS    Diet & Exercise     The \"Mediterranean Diet\" is well studied and recommended for its heart healthy benefits.  It focuses on eating fruits, vegetables, beans/nuts, lean dairy, lean protein such as fish/chicken, and using olive oil instead of butter.  It's recommended to limit carbohydrates such as

## 2025-05-23 NOTE — PLAN OF CARE
Problem: Discharge Planning  Goal: Discharge to home or other facility with appropriate resources  Outcome: Progressing     Problem: ABCDS Injury Assessment  Goal: Absence of physical injury  Outcome: Progressing     Problem: Safety - Adult  Goal: Free from fall injury  Outcome: Progressing     Problem: Pain  Goal: Verbalizes/displays adequate comfort level or baseline comfort level  Outcome: Progressing     Problem: Respiratory - Adult  Goal: Achieves optimal ventilation and oxygenation  Outcome: Progressing     Problem: Cardiovascular - Adult  Goal: Maintains optimal cardiac output and hemodynamic stability  Outcome: Progressing     Problem: Skin/Tissue Integrity - Adult  Goal: Incisions, wounds, or drain sites healing without S/S of infection  Outcome: Progressing     Problem: Metabolic/Fluid and Electrolytes - Adult  Goal: Electrolytes maintained within normal limits  Outcome: Progressing

## 2025-05-24 VITALS
HEIGHT: 70 IN | OXYGEN SATURATION: 97 % | RESPIRATION RATE: 16 BRPM | SYSTOLIC BLOOD PRESSURE: 125 MMHG | WEIGHT: 194.45 LBS | DIASTOLIC BLOOD PRESSURE: 81 MMHG | BODY MASS INDEX: 27.84 KG/M2 | HEART RATE: 60 BPM | TEMPERATURE: 98.4 F

## 2025-05-24 LAB
ALBUMIN SERPL-MCNC: 3.6 G/DL (ref 3.4–5)
ALBUMIN/GLOB SERPL: 1.6 {RATIO} (ref 1.1–2.2)
ALP SERPL-CCNC: 28 U/L (ref 40–129)
ALT SERPL-CCNC: 17 U/L (ref 10–40)
ANION GAP SERPL CALCULATED.3IONS-SCNC: 9 MMOL/L (ref 3–16)
AST SERPL-CCNC: 20 U/L (ref 15–37)
BASOPHILS # BLD: 0 K/UL (ref 0–0.2)
BASOPHILS NFR BLD: 0.3 %
BILIRUB SERPL-MCNC: 1.4 MG/DL (ref 0–1)
BUN SERPL-MCNC: 21 MG/DL (ref 7–20)
CALCIUM SERPL-MCNC: 9.2 MG/DL (ref 8.3–10.6)
CHLORIDE SERPL-SCNC: 105 MMOL/L (ref 99–110)
CO2 SERPL-SCNC: 22 MMOL/L (ref 21–32)
CREAT SERPL-MCNC: 0.9 MG/DL (ref 0.8–1.3)
DEPRECATED RDW RBC AUTO: 13.3 % (ref 12.4–15.4)
EOSINOPHIL # BLD: 0.2 K/UL (ref 0–0.6)
EOSINOPHIL NFR BLD: 2.6 %
GFR SERPLBLD CREATININE-BSD FMLA CKD-EPI: 83 ML/MIN/{1.73_M2}
GLUCOSE SERPL-MCNC: 101 MG/DL (ref 70–99)
HCT VFR BLD AUTO: 38.1 % (ref 40.5–52.5)
HGB BLD-MCNC: 13.2 G/DL (ref 13.5–17.5)
LYMPHOCYTES # BLD: 1.6 K/UL (ref 1–5.1)
LYMPHOCYTES NFR BLD: 24.8 %
MCH RBC QN AUTO: 32.4 PG (ref 26–34)
MCHC RBC AUTO-ENTMCNC: 34.6 G/DL (ref 31–36)
MCV RBC AUTO: 93.6 FL (ref 80–100)
MONOCYTES # BLD: 0.8 K/UL (ref 0–1.3)
MONOCYTES NFR BLD: 12 %
NEUTROPHILS # BLD: 3.9 K/UL (ref 1.7–7.7)
NEUTROPHILS NFR BLD: 60.3 %
PLATELET # BLD AUTO: 142 K/UL (ref 135–450)
PMV BLD AUTO: 7.9 FL (ref 5–10.5)
POTASSIUM SERPL-SCNC: 4.1 MMOL/L (ref 3.5–5.1)
PROT SERPL-MCNC: 5.9 G/DL (ref 6.4–8.2)
RBC # BLD AUTO: 4.07 M/UL (ref 4.2–5.9)
SODIUM SERPL-SCNC: 136 MMOL/L (ref 136–145)
WBC # BLD AUTO: 6.5 K/UL (ref 4–11)

## 2025-05-24 PROCEDURE — 80053 COMPREHEN METABOLIC PANEL: CPT

## 2025-05-24 PROCEDURE — G0378 HOSPITAL OBSERVATION PER HR: HCPCS

## 2025-05-24 PROCEDURE — 36415 COLL VENOUS BLD VENIPUNCTURE: CPT

## 2025-05-24 PROCEDURE — 6370000000 HC RX 637 (ALT 250 FOR IP)

## 2025-05-24 PROCEDURE — 6370000000 HC RX 637 (ALT 250 FOR IP): Performed by: STUDENT IN AN ORGANIZED HEALTH CARE EDUCATION/TRAINING PROGRAM

## 2025-05-24 PROCEDURE — 2500000003 HC RX 250 WO HCPCS

## 2025-05-24 PROCEDURE — 85025 COMPLETE CBC W/AUTO DIFF WBC: CPT

## 2025-05-24 RX ADMIN — SODIUM CHLORIDE, PRESERVATIVE FREE 10 ML: 5 INJECTION INTRAVENOUS at 08:39

## 2025-05-24 RX ADMIN — APIXABAN 5 MG: 5 TABLET, FILM COATED ORAL at 08:29

## 2025-05-24 RX ADMIN — ATORVASTATIN CALCIUM 10 MG: 10 TABLET, FILM COATED ORAL at 08:30

## 2025-05-24 RX ADMIN — SERTRALINE 100 MG: 50 TABLET, FILM COATED ORAL at 08:30

## 2025-05-24 RX ADMIN — HYDROXYZINE HYDROCHLORIDE 50 MG: 25 TABLET, FILM COATED ORAL at 08:30

## 2025-05-24 RX ADMIN — CARVEDILOL 6.25 MG: 6.25 TABLET, FILM COATED ORAL at 08:31

## 2025-05-24 RX ADMIN — Medication 1 TABLET: at 08:30

## 2025-05-24 NOTE — DISCHARGE SUMMARY
Hospital Medicine Discharge Summary    Name:  Liu Staples  Gender: male  : 1940  84 y.o.  MRN: [id number]    PCP: No primary care provider on file.     Date of Admission:  2025  9:41 PM  Discharge Date: 2025    Admitting Physician: Maci Aguila MD  Discharge Physician: Po Reid DO    Communication to PCP  - Started Coreg 6.25 mg twice daily  - Clonidine 0.1 mg 3 times daily as needed for SBP greater than 160  - Atarax 50 mg 3 times daily as needed for anxiety; continue Zoloft, though may need increased  - Stop Cardizem      Discharge Diagnoses:       Active Hospital Problems    Diagnosis     Hypertensive emergency [I16.1]     GONZALO (acute kidney injury) [N17.9]     Atrial fibrillation (HCC) [I48.91]     High cholesterol [E78.00]     HTN (hypertension) [I10]        The patient was seen and examined on day of discharge and this discharge summary is in conjunction with any daily progress note from day of discharge.    Hospital Course:  Liu Staples is a 84 y.o. year old male who presented to [facility] on 2025  9:41 PM.      Patient admitted for hypertension.  Was started on a Cardene drip, but quickly weaned off.  Cardiology was consulted.     Cardiology discontinued patient's home diltiazem and switch patient to Coreg and amlodipine for A-fib and hypertension control.  On , patient did have a drop in his blood pressure and amlodipine was held.  Patient's blood pressure recovered and was tolerating Coreg.  Amlodipine will not be prescribed on discharge.     Patient did have a pancreatic mass and liver lesions noted on CT abdomen.  MRI abdomen showed masses, but no emergent need for surgical intervention at this time and can follow-up outpatient with GI if needed.     Patient was also found to have 4 cm AAA on CT abdomen.  Patient will follow-up outpatient with his PCP regarding this.     Orthostatic blood pressures were measured on  and were normal.      On

## 2025-05-25 LAB
ECHO BSA: 2.12 M2
VAS AORTA DIST AP: 1.81 CM
VAS AORTA DIST TR: 2.05 CM
VAS AORTA MID AP: 1.91 CM
VAS AORTA MID PSV: 88.5 CM/S
VAS AORTA MID TRANS: 2 CM
VAS AORTA PROX AP: 1.66 CM
VAS AORTA PROX TR: 1.87 CM
VAS L RENAL ORIG RI: 0.71 NO UNITS
VAS LEFT KIDNEY LENGTH: 11.5 CM
VAS LEFT KIDNEY WIDTH: 5.15 CM
VAS LEFT RENAL DIST EDV: 24.7 CM/S
VAS LEFT RENAL DIST PSV: 38.4 CM/S
VAS LEFT RENAL DIST RAR: 0.43
VAS LEFT RENAL DIST RI: 0.36 NO UNITS
VAS LEFT RENAL LOWER PARENCHYMA EDV: 8.6 CM/S
VAS LEFT RENAL LOWER PARENCHYMA PSV: 27.5 CM/S
VAS LEFT RENAL LOWER PARENCHYMA RI: 0.69 NO UNITS
VAS LEFT RENAL MID EDV: 19.8 CM/S
VAS LEFT RENAL MID PSV: 64.8 CM/S
VAS LEFT RENAL MID RAR: 0.73
VAS LEFT RENAL MID RI: 0.69 NO UNITS
VAS LEFT RENAL ORIGIN EDV: 32.4 CM/S
VAS LEFT RENAL ORIGIN PSV: 112 CM/S
VAS LEFT RENAL ORIGIN RAR: 1.27
VAS LEFT RENAL PROX EDV: 31 CM/S
VAS LEFT RENAL PROX PSV: 160 CM/S
VAS LEFT RENAL PROX RAR: 1.81
VAS LEFT RENAL PROX RI: 0.81 NO UNITS
VAS LEFT RENAL RAR: 1.81
VAS LEFT RENAL UPPER PARENCHYMA EDV: 9.3 CM/S
VAS LEFT RENAL UPPER PARENCHYMA PSV: 23.7 CM/S
VAS LEFT RENAL UPPER PARENCHYMA RI: 0.61 NO UNITS
VAS RIGHT CYST DIMENSIONS LENGTH: 3.41 CM
VAS RIGHT KIDNEY LENGTH: 11 CM
VAS RIGHT KIDNEY WIDTH: 4.89 CM
VAS RIGHT RENAL DIST EDV: 29.3 CM/S
VAS RIGHT RENAL DIST PSV: 94 CM/S
VAS RIGHT RENAL DIST RAR: 1.06
VAS RIGHT RENAL DIST RI: 0.69 NO UNITS
VAS RIGHT RENAL LOWER PARENCHYMA EDV: 7.1 CM/S
VAS RIGHT RENAL LOWER PARENCHYMA PSV: 28 CM/S
VAS RIGHT RENAL LOWER PARENCHYMA RI: 0.75 NO UNITS
VAS RIGHT RENAL MID EDV: 25.4 CM/S
VAS RIGHT RENAL MID PSV: 98.6 CM/S
VAS RIGHT RENAL MID RAR: 1.11
VAS RIGHT RENAL MID RI: 0.74 NO UNITS
VAS RIGHT RENAL ORIGIN EDV: 33.5 CM/S
VAS RIGHT RENAL ORIGIN PSV: 106 CM/S
VAS RIGHT RENAL ORIGIN RAR: 1.2
VAS RIGHT RENAL ORIGIN RI: 0.68 NO UNITS
VAS RIGHT RENAL PROX EDV: 37 CM/S
VAS RIGHT RENAL PROX PSV: 126 CM/S
VAS RIGHT RENAL PROX RAR: 1.42
VAS RIGHT RENAL PROX RI: 0.71 NO UNITS
VAS RIGHT RENAL RAR: 1.42
VAS RIGHT RENAL UPPER PARENCHYMA EDV: 7.6 CM/S
VAS RIGHT RENAL UPPER PARENCHYMA PSV: 22.1 CM/S
VAS RIGHT RENAL UPPER PARENCHYMA RI: 0.66 NO UNITS

## 2025-05-25 PROCEDURE — 93975 VASCULAR STUDY: CPT | Performed by: SURGERY

## 2025-05-26 ENCOUNTER — HOSPITAL ENCOUNTER (EMERGENCY)
Age: 85
Discharge: HOME | End: 2025-05-26
Attending: STUDENT IN AN ORGANIZED HEALTH CARE EDUCATION/TRAINING PROGRAM
Payer: MEDICARE

## 2025-05-26 VITALS
HEIGHT: 70 IN | HEART RATE: 54 BPM | WEIGHT: 199.08 LBS | SYSTOLIC BLOOD PRESSURE: 157 MMHG | TEMPERATURE: 97 F | RESPIRATION RATE: 16 BRPM | OXYGEN SATURATION: 96 % | DIASTOLIC BLOOD PRESSURE: 79 MMHG | BODY MASS INDEX: 28.5 KG/M2

## 2025-05-26 DIAGNOSIS — I10 ASYMPTOMATIC HYPERTENSION: Primary | ICD-10-CM

## 2025-05-26 DIAGNOSIS — F41.1 ANXIETY STATE: ICD-10-CM

## 2025-05-26 LAB
EKG ATRIAL RATE: 52 BPM
EKG DIAGNOSIS: NORMAL
EKG P AXIS: 35 DEGREES
EKG P-R INTERVAL: 280 MS
EKG Q-T INTERVAL: 448 MS
EKG QRS DURATION: 104 MS
EKG QTC CALCULATION (BAZETT): 416 MS
EKG R AXIS: -44 DEGREES
EKG T AXIS: 10 DEGREES
EKG VENTRICULAR RATE: 52 BPM

## 2025-05-26 PROCEDURE — 93010 ELECTROCARDIOGRAM REPORT: CPT | Performed by: INTERNAL MEDICINE

## 2025-05-26 PROCEDURE — 99283 EMERGENCY DEPT VISIT LOW MDM: CPT

## 2025-05-26 PROCEDURE — 93005 ELECTROCARDIOGRAM TRACING: CPT | Performed by: STUDENT IN AN ORGANIZED HEALTH CARE EDUCATION/TRAINING PROGRAM

## 2025-05-26 ASSESSMENT — PAIN - FUNCTIONAL ASSESSMENT: PAIN_FUNCTIONAL_ASSESSMENT: NONE - DENIES PAIN

## 2025-05-26 NOTE — DISCHARGE INSTRUCTIONS
It is mandatory that you follow up with your primary care provider and cardiology to ensure resolution/improvement of your symptoms.  If you do not have a primary care provider, please see discharge paperwork for instructions to contact [facility], who are currently accepting new patients.    Please see your discharge paperwork for information to contact Dr. Basilio with cardiology.  I do not see any note indicating a pending appointment.  Please call them first thing Tuesday morning and attempt a get an appointment.    MANDATORY return to the emergency department within 12-24 hours unless you are better.  If you feel worse or have any other concerns, return sooner.    If you experience any of the red flag signs/symptoms that we discussed, please return to the emergency department or call 911 immediately.    Please take medications as we discussed.

## 2025-05-26 NOTE — FLOWSHEET NOTE
Discharge and education instructions reviewed. Patient verbalized understanding, teach-back successful. Patient denied questions at this time. No acute distress noted. Patient instructed to follow-up as noted - return to emergency department if symptoms worsen. Patient verbalized understanding. Discharged per EDMD with discharge instructions.     none

## 2025-05-26 NOTE — ED PROVIDER NOTES
EMERGENCY DEPARTMENT PROVIDER NOTE         PATIENT IDENTIFICATION     Name:   Liu Staples  MRN:   [id number]  YOB: 1940  Date of Evaluation:   5/26/2025  Provider:   Maxime Little DO  PCP:   No primary care provider on file.        CHIEF COMPLAINT       Hypertension (Pt arrives from home via EMS C/O high blood pressure. Pt reports taking blood pressure about one hour ago and got a high reading. Pt denies chest pain, denies N/V, denies SOB. Pt reports on-going issues with blood pressure for about one month, reports intermittent weakness as only complaint. Pt Alert and oriented x4, VSS.)        HISTORY OF PRESENT ILLNESS     Liu Staples is a(n) 84 y.o. male with past medical history as belowincluding anxiety and hypertension  who arrives via private vehicle for hypertension.  Patient woke up at 0213 feeling anxious.  They checked his blood pressure and found it to be over 200 systolic.  Patient's daughter gave him clonidine which improved his blood pressure.  The patient currently denies neck pain, vision change, fever, chills, recent illness, headache, rash, chest pain, shortness of breath, cough, abdominal pain, nausea, vomiting, change in bowel movements, and urinary symptoms.  He affirms history of similar symptoms and has had great difficulty controlling his blood pressure at home.  He is currently working with Dr. Dorado for this.  Is post see him on Tuesday.    I personally reviewed the following nurse documentation:  Past Medical History:   Diagnosis Date    Anxiety     Atrial fibrillation (HCC)     Hyperlipidemia     Hypertension      Prior to Admission medications    Medication Sig Start Date End Date Taking? Authorizing Provider   carvedilol (COREG) 6.25 MG tablet Take 1 tablet by mouth 2 times daily (with meals) 5/23/25   Po Reid DO   cloNIDine (CATAPRES) 0.1 MG tablet Take 1 tablet by mouth 3 times daily as needed for High Blood Pressure (Take if SBP >160) 5/23/25